# Patient Record
Sex: MALE | Race: WHITE | Employment: FULL TIME | URBAN - METROPOLITAN AREA
[De-identification: names, ages, dates, MRNs, and addresses within clinical notes are randomized per-mention and may not be internally consistent; named-entity substitution may affect disease eponyms.]

---

## 2019-07-20 ENCOUNTER — APPOINTMENT (OUTPATIENT)
Dept: GENERAL RADIOLOGY | Age: 59
DRG: 271 | End: 2019-07-20
Payer: COMMERCIAL

## 2019-07-20 ENCOUNTER — HOSPITAL ENCOUNTER (INPATIENT)
Age: 59
LOS: 11 days | Discharge: HOME | DRG: 271 | End: 2019-07-31
Attending: EMERGENCY MEDICINE | Admitting: SURGERY
Payer: COMMERCIAL

## 2019-07-20 ENCOUNTER — ANESTHESIA (OUTPATIENT)
Dept: OPERATING ROOM | Age: 59
DRG: 271 | End: 2019-07-20
Payer: COMMERCIAL

## 2019-07-20 ENCOUNTER — ANESTHESIA EVENT (OUTPATIENT)
Dept: OPERATING ROOM | Age: 59
DRG: 271 | End: 2019-07-20
Payer: COMMERCIAL

## 2019-07-20 VITALS — OXYGEN SATURATION: 100 % | SYSTOLIC BLOOD PRESSURE: 143 MMHG | DIASTOLIC BLOOD PRESSURE: 58 MMHG | TEMPERATURE: 96.6 F

## 2019-07-20 DIAGNOSIS — I70.0 AORTIC OCCLUSION (HCC): Primary | ICD-10-CM

## 2019-07-20 LAB
ABSOLUTE EOS #: 0 K/UL (ref 0–0.4)
ABSOLUTE EOS #: 0.11 K/UL (ref 0–0.44)
ABSOLUTE IMMATURE GRANULOCYTE: 0.09 K/UL (ref 0–0.3)
ABSOLUTE IMMATURE GRANULOCYTE: 0.27 K/UL (ref 0–0.3)
ABSOLUTE LYMPH #: 1.88 K/UL (ref 1–4.8)
ABSOLUTE LYMPH #: 2.94 K/UL (ref 1.1–3.7)
ABSOLUTE MONO #: 0.54 K/UL (ref 0.1–0.8)
ABSOLUTE MONO #: 1.31 K/UL (ref 0.1–1.2)
ALLEN TEST: ABNORMAL
ANION GAP SERPL CALCULATED.3IONS-SCNC: 12 MMOL/L (ref 9–17)
ANION GAP SERPL CALCULATED.3IONS-SCNC: 9 MMOL/L (ref 9–17)
BASOPHILS # BLD: 0 % (ref 0–2)
BASOPHILS # BLD: 1 % (ref 0–2)
BASOPHILS ABSOLUTE: 0 K/UL (ref 0–0.2)
BASOPHILS ABSOLUTE: 0.08 K/UL (ref 0–0.2)
BUN BLDV-MCNC: 6 MG/DL (ref 8–23)
BUN BLDV-MCNC: 7 MG/DL (ref 8–23)
BUN/CREAT BLD: ABNORMAL (ref 9–20)
BUN/CREAT BLD: ABNORMAL (ref 9–20)
CALCIUM SERPL-MCNC: 7.3 MG/DL (ref 8.6–10.4)
CALCIUM SERPL-MCNC: 8 MG/DL (ref 8.6–10.4)
CARBOXYHEMOGLOBIN: 3.4 % (ref 0–5)
CHLORIDE BLD-SCNC: 109 MMOL/L (ref 98–107)
CHLORIDE BLD-SCNC: 111 MMOL/L (ref 98–107)
CO2: 19 MMOL/L (ref 20–31)
CO2: 22 MMOL/L (ref 20–31)
CREAT SERPL-MCNC: 0.77 MG/DL (ref 0.7–1.2)
CREAT SERPL-MCNC: 0.77 MG/DL (ref 0.7–1.2)
DIFFERENTIAL TYPE: ABNORMAL
DIFFERENTIAL TYPE: ABNORMAL
EOSINOPHILS RELATIVE PERCENT: 0 % (ref 1–4)
EOSINOPHILS RELATIVE PERCENT: 1 % (ref 1–4)
FIO2: 70
GFR AFRICAN AMERICAN: >60 ML/MIN
GFR AFRICAN AMERICAN: >60 ML/MIN
GFR NON-AFRICAN AMERICAN: >60 ML/MIN
GFR NON-AFRICAN AMERICAN: >60 ML/MIN
GFR SERPL CREATININE-BSD FRML MDRD: ABNORMAL ML/MIN/{1.73_M2}
GLUCOSE BLD-MCNC: 130 MG/DL (ref 70–99)
GLUCOSE BLD-MCNC: 93 MG/DL (ref 70–99)
HCO3 ARTERIAL: 20.2 MMOL/L (ref 22–27)
HCT VFR BLD CALC: 40 % (ref 40.7–50.3)
HCT VFR BLD CALC: 40.2 %
HCT VFR BLD CALC: 41.4 % (ref 40.7–50.3)
HEMOGLOBIN: 13.1 G/DL (ref 13–17)
HEMOGLOBIN: 13.1 GM/DL
HEMOGLOBIN: 13.5 G/DL (ref 13–17)
IMMATURE GRANULOCYTES: 1 %
IMMATURE GRANULOCYTES: 1 %
LYMPHOCYTES # BLD: 18 % (ref 24–43)
LYMPHOCYTES # BLD: 7 % (ref 24–44)
MAGNESIUM: 1.6 MG/DL (ref 1.6–2.6)
MCH RBC QN AUTO: 30.4 PG (ref 25.2–33.5)
MCH RBC QN AUTO: 30.6 PG (ref 25.2–33.5)
MCHC RBC AUTO-ENTMCNC: 32.6 G/DL (ref 28.4–34.8)
MCHC RBC AUTO-ENTMCNC: 32.8 G/DL (ref 28.4–34.8)
MCV RBC AUTO: 93.2 FL (ref 82.6–102.9)
MCV RBC AUTO: 93.5 FL (ref 82.6–102.9)
METHEMOGLOBIN: ABNORMAL % (ref 0–1.5)
MODE: ABNORMAL
MONOCYTES # BLD: 2 % (ref 1–7)
MONOCYTES # BLD: 8 % (ref 3–12)
MORPHOLOGY: NORMAL
NEGATIVE BASE EXCESS, ART: 5 MMOL/L (ref 0–2)
NOTIFICATION TIME: ABNORMAL
NOTIFICATION: ABNORMAL
NRBC AUTOMATED: 0 PER 100 WBC
NRBC AUTOMATED: 0 PER 100 WBC
O2 DEVICE/FLOW/%: ABNORMAL
O2 SAT, ARTERIAL: 98.9 % (ref 94–100)
OXYHEMOGLOBIN: ABNORMAL % (ref 95–98)
PARTIAL THROMBOPLASTIN TIME: 117.6 SEC (ref 20.5–30.5)
PATIENT TEMP: 37
PCO2 ARTERIAL: 39.8 MMHG (ref 32–45)
PCO2, ART, TEMP ADJ: ABNORMAL (ref 32–45)
PDW BLD-RTO: 12.6 % (ref 11.8–14.4)
PDW BLD-RTO: 12.8 % (ref 11.8–14.4)
PEEP/CPAP: ABNORMAL
PH ARTERIAL: 7.33 (ref 7.35–7.45)
PH, ART, TEMP ADJ: ABNORMAL (ref 7.35–7.45)
PHOSPHORUS: 3.2 MG/DL (ref 2.5–4.5)
PLATELET # BLD: 178 K/UL (ref 138–453)
PLATELET # BLD: 220 K/UL (ref 138–453)
PLATELET ESTIMATE: ABNORMAL
PLATELET ESTIMATE: ABNORMAL
PMV BLD AUTO: 10.7 FL (ref 8.1–13.5)
PMV BLD AUTO: 9.7 FL (ref 8.1–13.5)
PO2 ARTERIAL: 215 MMHG (ref 75–95)
PO2, ART, TEMP ADJ: ABNORMAL MMHG (ref 75–95)
POSITIVE BASE EXCESS, ART: ABNORMAL MMOL/L (ref 0–2)
POTASSIUM SERPL-SCNC: 4 MMOL/L (ref 3.7–5.3)
POTASSIUM SERPL-SCNC: 4.2 MMOL/L (ref 3.7–5.3)
PSV: ABNORMAL
PT. POSITION: ABNORMAL
RBC # BLD: 4.28 M/UL (ref 4.21–5.77)
RBC # BLD: 4.44 M/UL (ref 4.21–5.77)
RBC # BLD: ABNORMAL 10*6/UL
RBC # BLD: ABNORMAL 10*6/UL
RESPIRATORY RATE: ABNORMAL
SAMPLE SITE: ABNORMAL
SEG NEUTROPHILS: 71 % (ref 36–65)
SEG NEUTROPHILS: 90 % (ref 36–66)
SEGMENTED NEUTROPHILS ABSOLUTE COUNT: 12.29 K/UL (ref 1.5–8.1)
SEGMENTED NEUTROPHILS ABSOLUTE COUNT: 24.21 K/UL (ref 1.8–7.7)
SET RATE: ABNORMAL
SODIUM BLD-SCNC: 140 MMOL/L (ref 135–144)
SODIUM BLD-SCNC: 142 MMOL/L (ref 135–144)
TEXT FOR RESPIRATORY: ABNORMAL
TOTAL HB: ABNORMAL G/DL (ref 12–16)
TOTAL RATE: ABNORMAL
VT: ABNORMAL
WBC # BLD: 16.8 K/UL (ref 3.5–11.3)
WBC # BLD: 26.9 K/UL (ref 3.5–11.3)
WBC # BLD: ABNORMAL 10*3/UL
WBC # BLD: ABNORMAL 10*3/UL

## 2019-07-20 PROCEDURE — 2580000003 HC RX 258: Performed by: STUDENT IN AN ORGANIZED HEALTH CARE EDUCATION/TRAINING PROGRAM

## 2019-07-20 PROCEDURE — 84100 ASSAY OF PHOSPHORUS: CPT

## 2019-07-20 PROCEDURE — 93005 ELECTROCARDIOGRAM TRACING: CPT | Performed by: EMERGENCY MEDICINE

## 2019-07-20 PROCEDURE — 87086 URINE CULTURE/COLONY COUNT: CPT

## 2019-07-20 PROCEDURE — 2580000003 HC RX 258: Performed by: NURSE ANESTHETIST, CERTIFIED REGISTERED

## 2019-07-20 PROCEDURE — 2500000003 HC RX 250 WO HCPCS: Performed by: NURSE ANESTHETIST, CERTIFIED REGISTERED

## 2019-07-20 PROCEDURE — 86901 BLOOD TYPING SEROLOGIC RH(D): CPT

## 2019-07-20 PROCEDURE — 86920 COMPATIBILITY TEST SPIN: CPT

## 2019-07-20 PROCEDURE — 6360000002 HC RX W HCPCS

## 2019-07-20 PROCEDURE — 6360000002 HC RX W HCPCS: Performed by: NURSE ANESTHETIST, CERTIFIED REGISTERED

## 2019-07-20 PROCEDURE — 85025 COMPLETE CBC W/AUTO DIFF WBC: CPT

## 2019-07-20 PROCEDURE — 99285 EMERGENCY DEPT VISIT HI MDM: CPT

## 2019-07-20 PROCEDURE — 6370000000 HC RX 637 (ALT 250 FOR IP): Performed by: SURGERY

## 2019-07-20 PROCEDURE — 82805 BLOOD GASES W/O2 SATURATION: CPT

## 2019-07-20 PROCEDURE — 86850 RBC ANTIBODY SCREEN: CPT

## 2019-07-20 PROCEDURE — 36415 COLL VENOUS BLD VENIPUNCTURE: CPT

## 2019-07-20 PROCEDURE — 7100000000 HC PACU RECOVERY - FIRST 15 MIN: Performed by: SURGERY

## 2019-07-20 PROCEDURE — 2500000003 HC RX 250 WO HCPCS: Performed by: STUDENT IN AN ORGANIZED HEALTH CARE EDUCATION/TRAINING PROGRAM

## 2019-07-20 PROCEDURE — 3600000015 HC SURGERY LEVEL 5 ADDTL 15MIN: Performed by: SURGERY

## 2019-07-20 PROCEDURE — 04100JK BYPASS ABDOMINAL AORTA TO BILATERAL FEMORAL ARTERIES WITH SYNTHETIC SUBSTITUTE, OPEN APPROACH: ICD-10-PCS | Performed by: SURGERY

## 2019-07-20 PROCEDURE — 83735 ASSAY OF MAGNESIUM: CPT

## 2019-07-20 PROCEDURE — 85730 THROMBOPLASTIN TIME PARTIAL: CPT

## 2019-07-20 PROCEDURE — 2500000003 HC RX 250 WO HCPCS: Performed by: SURGERY

## 2019-07-20 PROCEDURE — 3600000005 HC SURGERY LEVEL 5 BASE: Performed by: SURGERY

## 2019-07-20 PROCEDURE — 80048 BASIC METABOLIC PNL TOTAL CA: CPT

## 2019-07-20 PROCEDURE — 85014 HEMATOCRIT: CPT

## 2019-07-20 PROCEDURE — 2709999900 HC NON-CHARGEABLE SUPPLY: Performed by: SURGERY

## 2019-07-20 PROCEDURE — 6370000000 HC RX 637 (ALT 250 FOR IP): Performed by: STUDENT IN AN ORGANIZED HEALTH CARE EDUCATION/TRAINING PROGRAM

## 2019-07-20 PROCEDURE — 3700000001 HC ADD 15 MINUTES (ANESTHESIA): Performed by: SURGERY

## 2019-07-20 PROCEDURE — 3700000000 HC ANESTHESIA ATTENDED CARE: Performed by: SURGERY

## 2019-07-20 PROCEDURE — L8670 VASCULAR GRAFT, SYNTHETIC: HCPCS | Performed by: SURGERY

## 2019-07-20 PROCEDURE — 71045 X-RAY EXAM CHEST 1 VIEW: CPT

## 2019-07-20 PROCEDURE — 2580000003 HC RX 258: Performed by: SURGERY

## 2019-07-20 PROCEDURE — 6360000002 HC RX W HCPCS: Performed by: SURGERY

## 2019-07-20 PROCEDURE — 86900 BLOOD TYPING SEROLOGIC ABO: CPT

## 2019-07-20 PROCEDURE — 2000000000 HC ICU R&B

## 2019-07-20 PROCEDURE — 85018 HEMOGLOBIN: CPT

## 2019-07-20 PROCEDURE — 7100000001 HC PACU RECOVERY - ADDTL 15 MIN: Performed by: SURGERY

## 2019-07-20 PROCEDURE — C1757 CATH, THROMBECTOMY/EMBOLECT: HCPCS | Performed by: SURGERY

## 2019-07-20 PROCEDURE — 6360000002 HC RX W HCPCS: Performed by: STUDENT IN AN ORGANIZED HEALTH CARE EDUCATION/TRAINING PROGRAM

## 2019-07-20 PROCEDURE — 6360000002 HC RX W HCPCS: Performed by: ANESTHESIOLOGY

## 2019-07-20 DEVICE — IMPLANTABLE DEVICE: Type: IMPLANTABLE DEVICE | Site: AORTA | Status: FUNCTIONAL

## 2019-07-20 RX ORDER — ONDANSETRON 2 MG/ML
INJECTION INTRAMUSCULAR; INTRAVENOUS PRN
Status: DISCONTINUED | OUTPATIENT
Start: 2019-07-20 | End: 2019-07-20 | Stop reason: SDUPTHER

## 2019-07-20 RX ORDER — SODIUM CHLORIDE, SODIUM LACTATE, POTASSIUM CHLORIDE, CALCIUM CHLORIDE 600; 310; 30; 20 MG/100ML; MG/100ML; MG/100ML; MG/100ML
INJECTION, SOLUTION INTRAVENOUS CONTINUOUS PRN
Status: DISCONTINUED | OUTPATIENT
Start: 2019-07-20 | End: 2019-07-20 | Stop reason: SDUPTHER

## 2019-07-20 RX ORDER — ONDANSETRON 2 MG/ML
4 INJECTION INTRAMUSCULAR; INTRAVENOUS EVERY 6 HOURS PRN
Status: DISCONTINUED | OUTPATIENT
Start: 2019-07-20 | End: 2019-07-31 | Stop reason: HOSPADM

## 2019-07-20 RX ORDER — CEFAZOLIN SODIUM 1 G/3ML
INJECTION, POWDER, FOR SOLUTION INTRAMUSCULAR; INTRAVENOUS PRN
Status: DISCONTINUED | OUTPATIENT
Start: 2019-07-20 | End: 2019-07-20 | Stop reason: SDUPTHER

## 2019-07-20 RX ORDER — FENTANYL CITRATE 50 UG/ML
25 INJECTION, SOLUTION INTRAMUSCULAR; INTRAVENOUS EVERY 5 MIN PRN
Status: DISCONTINUED | OUTPATIENT
Start: 2019-07-20 | End: 2019-07-20 | Stop reason: HOSPADM

## 2019-07-20 RX ORDER — SODIUM CHLORIDE 0.9 % (FLUSH) 0.9 %
10 SYRINGE (ML) INJECTION PRN
Status: DISCONTINUED | OUTPATIENT
Start: 2019-07-20 | End: 2019-07-31 | Stop reason: HOSPADM

## 2019-07-20 RX ORDER — PROPOFOL 10 MG/ML
INJECTION, EMULSION INTRAVENOUS PRN
Status: DISCONTINUED | OUTPATIENT
Start: 2019-07-20 | End: 2019-07-20 | Stop reason: SDUPTHER

## 2019-07-20 RX ORDER — ACETAMINOPHEN 500 MG
1000 TABLET ORAL EVERY 8 HOURS
Status: DISCONTINUED | OUTPATIENT
Start: 2019-07-20 | End: 2019-07-31 | Stop reason: HOSPADM

## 2019-07-20 RX ORDER — LIDOCAINE HYDROCHLORIDE 10 MG/ML
INJECTION, SOLUTION EPIDURAL; INFILTRATION; INTRACAUDAL; PERINEURAL PRN
Status: DISCONTINUED | OUTPATIENT
Start: 2019-07-20 | End: 2019-07-20 | Stop reason: SDUPTHER

## 2019-07-20 RX ORDER — MAGNESIUM HYDROXIDE 1200 MG/15ML
LIQUID ORAL CONTINUOUS PRN
Status: COMPLETED | OUTPATIENT
Start: 2019-07-20 | End: 2019-07-20

## 2019-07-20 RX ORDER — FENTANYL CITRATE 50 UG/ML
25 INJECTION, SOLUTION INTRAMUSCULAR; INTRAVENOUS
Status: DISCONTINUED | OUTPATIENT
Start: 2019-07-20 | End: 2019-07-27

## 2019-07-20 RX ORDER — SODIUM CHLORIDE, SODIUM LACTATE, POTASSIUM CHLORIDE, CALCIUM CHLORIDE 600; 310; 30; 20 MG/100ML; MG/100ML; MG/100ML; MG/100ML
INJECTION, SOLUTION INTRAVENOUS CONTINUOUS
Status: DISCONTINUED | OUTPATIENT
Start: 2019-07-20 | End: 2019-07-25

## 2019-07-20 RX ORDER — ROCURONIUM BROMIDE 10 MG/ML
INJECTION, SOLUTION INTRAVENOUS PRN
Status: DISCONTINUED | OUTPATIENT
Start: 2019-07-20 | End: 2019-07-20 | Stop reason: SDUPTHER

## 2019-07-20 RX ORDER — SODIUM CHLORIDE 0.9 % (FLUSH) 0.9 %
10 SYRINGE (ML) INJECTION EVERY 12 HOURS SCHEDULED
Status: DISCONTINUED | OUTPATIENT
Start: 2019-07-20 | End: 2019-07-31 | Stop reason: HOSPADM

## 2019-07-20 RX ORDER — HEPARIN SODIUM 1000 [USP'U]/ML
INJECTION, SOLUTION INTRAVENOUS; SUBCUTANEOUS PRN
Status: DISCONTINUED | OUTPATIENT
Start: 2019-07-20 | End: 2019-07-20 | Stop reason: ALTCHOICE

## 2019-07-20 RX ORDER — SODIUM CHLORIDE 9 MG/ML
INJECTION, SOLUTION INTRAVENOUS CONTINUOUS PRN
Status: DISCONTINUED | OUTPATIENT
Start: 2019-07-20 | End: 2019-07-20 | Stop reason: SDUPTHER

## 2019-07-20 RX ORDER — DEXAMETHASONE SODIUM PHOSPHATE 10 MG/ML
INJECTION INTRAMUSCULAR; INTRAVENOUS PRN
Status: DISCONTINUED | OUTPATIENT
Start: 2019-07-20 | End: 2019-07-20 | Stop reason: SDUPTHER

## 2019-07-20 RX ORDER — OXYCODONE HYDROCHLORIDE 5 MG/1
5 TABLET ORAL EVERY 4 HOURS PRN
Status: DISCONTINUED | OUTPATIENT
Start: 2019-07-20 | End: 2019-07-27

## 2019-07-20 RX ORDER — EPHEDRINE SULFATE/0.9% NACL/PF 50 MG/5 ML
SYRINGE (ML) INTRAVENOUS PRN
Status: DISCONTINUED | OUTPATIENT
Start: 2019-07-20 | End: 2019-07-20 | Stop reason: SDUPTHER

## 2019-07-20 RX ORDER — 0.9 % SODIUM CHLORIDE 0.9 %
250 INTRAVENOUS SOLUTION INTRAVENOUS ONCE
Status: DISCONTINUED | OUTPATIENT
Start: 2019-07-20 | End: 2019-07-20

## 2019-07-20 RX ORDER — MORPHINE SULFATE 10 MG/ML
INJECTION, SOLUTION INTRAMUSCULAR; INTRAVENOUS PRN
Status: DISCONTINUED | OUTPATIENT
Start: 2019-07-20 | End: 2019-07-20 | Stop reason: SDUPTHER

## 2019-07-20 RX ORDER — HEPARIN SODIUM 1000 [USP'U]/ML
INJECTION, SOLUTION INTRAVENOUS; SUBCUTANEOUS PRN
Status: DISCONTINUED | OUTPATIENT
Start: 2019-07-20 | End: 2019-07-20 | Stop reason: SDUPTHER

## 2019-07-20 RX ORDER — PROTAMINE SULFATE 10 MG/ML
INJECTION, SOLUTION INTRAVENOUS PRN
Status: DISCONTINUED | OUTPATIENT
Start: 2019-07-20 | End: 2019-07-20 | Stop reason: SDUPTHER

## 2019-07-20 RX ORDER — HEPARIN SODIUM 10000 [USP'U]/100ML
INJECTION, SOLUTION INTRAVENOUS
Status: COMPLETED
Start: 2019-07-20 | End: 2019-07-20

## 2019-07-20 RX ORDER — FENTANYL CITRATE 50 UG/ML
50 INJECTION, SOLUTION INTRAMUSCULAR; INTRAVENOUS
Status: DISCONTINUED | OUTPATIENT
Start: 2019-07-20 | End: 2019-07-27

## 2019-07-20 RX ORDER — NEOSTIGMINE METHYLSULFATE 5 MG/5 ML
SYRINGE (ML) INTRAVENOUS PRN
Status: DISCONTINUED | OUTPATIENT
Start: 2019-07-20 | End: 2019-07-20 | Stop reason: SDUPTHER

## 2019-07-20 RX ORDER — FENTANYL CITRATE 50 UG/ML
INJECTION, SOLUTION INTRAMUSCULAR; INTRAVENOUS PRN
Status: DISCONTINUED | OUTPATIENT
Start: 2019-07-20 | End: 2019-07-20 | Stop reason: SDUPTHER

## 2019-07-20 RX ORDER — PHENYLEPHRINE HYDROCHLORIDE 10 MG/ML
INJECTION INTRAVENOUS PRN
Status: DISCONTINUED | OUTPATIENT
Start: 2019-07-20 | End: 2019-07-20 | Stop reason: SDUPTHER

## 2019-07-20 RX ORDER — ASPIRIN 81 MG/1
81 TABLET, CHEWABLE ORAL DAILY
Status: DISCONTINUED | OUTPATIENT
Start: 2019-07-20 | End: 2019-07-31 | Stop reason: HOSPADM

## 2019-07-20 RX ORDER — GLYCOPYRROLATE 1 MG/5 ML
SYRINGE (ML) INTRAVENOUS PRN
Status: DISCONTINUED | OUTPATIENT
Start: 2019-07-20 | End: 2019-07-20 | Stop reason: SDUPTHER

## 2019-07-20 RX ORDER — HEPARIN SODIUM 10000 [USP'U]/100ML
15 INJECTION, SOLUTION INTRAVENOUS CONTINUOUS
Status: DISCONTINUED | OUTPATIENT
Start: 2019-07-20 | End: 2019-07-20

## 2019-07-20 RX ORDER — FENTANYL CITRATE 50 UG/ML
50 INJECTION, SOLUTION INTRAMUSCULAR; INTRAVENOUS EVERY 5 MIN PRN
Status: DISCONTINUED | OUTPATIENT
Start: 2019-07-20 | End: 2019-07-20 | Stop reason: HOSPADM

## 2019-07-20 RX ORDER — DOCUSATE SODIUM 100 MG/1
100 CAPSULE, LIQUID FILLED ORAL 2 TIMES DAILY
Status: DISCONTINUED | OUTPATIENT
Start: 2019-07-20 | End: 2019-07-26

## 2019-07-20 RX ADMIN — PHENYLEPHRINE HYDROCHLORIDE 50 MCG: 10 INJECTION INTRAVENOUS at 17:27

## 2019-07-20 RX ADMIN — FENTANYL CITRATE 100 MCG: 50 INJECTION, SOLUTION INTRAMUSCULAR; INTRAVENOUS at 15:36

## 2019-07-20 RX ADMIN — Medication 10 MG: at 16:37

## 2019-07-20 RX ADMIN — PHENYLEPHRINE HYDROCHLORIDE 100 MCG: 10 INJECTION INTRAVENOUS at 15:03

## 2019-07-20 RX ADMIN — Medication 5 MG: at 16:57

## 2019-07-20 RX ADMIN — SODIUM CHLORIDE, POTASSIUM CHLORIDE, SODIUM LACTATE AND CALCIUM CHLORIDE: 600; 310; 30; 20 INJECTION, SOLUTION INTRAVENOUS at 14:54

## 2019-07-20 RX ADMIN — PHENYLEPHRINE HYDROCHLORIDE 100 MCG: 10 INJECTION INTRAVENOUS at 17:12

## 2019-07-20 RX ADMIN — HEPARIN SODIUM AND DEXTROSE 15 UNITS/KG/HR: 10000; 5 INJECTION INTRAVENOUS at 13:00

## 2019-07-20 RX ADMIN — ACETAMINOPHEN 1000 MG: 500 TABLET ORAL at 22:26

## 2019-07-20 RX ADMIN — FENTANYL CITRATE 50 MCG: 50 INJECTION, SOLUTION INTRAMUSCULAR; INTRAVENOUS at 17:49

## 2019-07-20 RX ADMIN — SODIUM CHLORIDE: 9 INJECTION, SOLUTION INTRAVENOUS at 14:38

## 2019-07-20 RX ADMIN — FENTANYL CITRATE 50 MCG: 50 INJECTION, SOLUTION INTRAMUSCULAR; INTRAVENOUS at 15:32

## 2019-07-20 RX ADMIN — FENTANYL CITRATE 50 MCG: 50 INJECTION, SOLUTION INTRAMUSCULAR; INTRAVENOUS at 15:24

## 2019-07-20 RX ADMIN — LIDOCAINE HYDROCHLORIDE 50 MG: 10 INJECTION, SOLUTION EPIDURAL; INFILTRATION; INTRACAUDAL; PERINEURAL at 14:44

## 2019-07-20 RX ADMIN — FENTANYL CITRATE 50 MCG: 50 INJECTION INTRAMUSCULAR; INTRAVENOUS at 19:20

## 2019-07-20 RX ADMIN — ROCURONIUM BROMIDE 10 MG: 10 INJECTION INTRAVENOUS at 15:10

## 2019-07-20 RX ADMIN — ROCURONIUM BROMIDE 10 MG: 10 INJECTION INTRAVENOUS at 15:51

## 2019-07-20 RX ADMIN — Medication 5 MG: at 16:45

## 2019-07-20 RX ADMIN — Medication 10 MG: at 16:15

## 2019-07-20 RX ADMIN — FENTANYL CITRATE 100 MCG: 50 INJECTION, SOLUTION INTRAMUSCULAR; INTRAVENOUS at 15:13

## 2019-07-20 RX ADMIN — ROCURONIUM BROMIDE 10 MG: 10 INJECTION INTRAVENOUS at 17:08

## 2019-07-20 RX ADMIN — HEPARIN SODIUM 15 UNITS/KG/HR: 10000 INJECTION, SOLUTION INTRAVENOUS at 13:00

## 2019-07-20 RX ADMIN — Medication 0.6 MG: at 18:35

## 2019-07-20 RX ADMIN — FENTANYL CITRATE 50 MCG: 50 INJECTION, SOLUTION INTRAMUSCULAR; INTRAVENOUS at 16:51

## 2019-07-20 RX ADMIN — ASPIRIN 81 MG: 81 TABLET, CHEWABLE ORAL at 22:26

## 2019-07-20 RX ADMIN — ONDANSETRON 4 MG: 2 INJECTION, SOLUTION INTRAMUSCULAR; INTRAVENOUS at 18:47

## 2019-07-20 RX ADMIN — ENOXAPARIN SODIUM 40 MG: 40 INJECTION SUBCUTANEOUS at 22:26

## 2019-07-20 RX ADMIN — CEFAZOLIN 2000 MG: 1 INJECTION, POWDER, FOR SOLUTION INTRAMUSCULAR; INTRAVENOUS at 15:07

## 2019-07-20 RX ADMIN — Medication 0.2 MG: at 15:10

## 2019-07-20 RX ADMIN — SODIUM CHLORIDE, POTASSIUM CHLORIDE, SODIUM LACTATE AND CALCIUM CHLORIDE: 600; 310; 30; 20 INJECTION, SOLUTION INTRAVENOUS at 20:46

## 2019-07-20 RX ADMIN — Medication 5 MG: at 15:04

## 2019-07-20 RX ADMIN — Medication 10 MG: at 15:08

## 2019-07-20 RX ADMIN — SODIUM CHLORIDE, POTASSIUM CHLORIDE, SODIUM LACTATE AND CALCIUM CHLORIDE: 600; 310; 30; 20 INJECTION, SOLUTION INTRAVENOUS at 18:27

## 2019-07-20 RX ADMIN — PHENYLEPHRINE HYDROCHLORIDE 50 MCG: 10 INJECTION INTRAVENOUS at 17:31

## 2019-07-20 RX ADMIN — SODIUM CHLORIDE, POTASSIUM CHLORIDE, SODIUM LACTATE AND CALCIUM CHLORIDE: 600; 310; 30; 20 INJECTION, SOLUTION INTRAVENOUS at 17:19

## 2019-07-20 RX ADMIN — ROCURONIUM BROMIDE 10 MG: 10 INJECTION INTRAVENOUS at 15:29

## 2019-07-20 RX ADMIN — PROPOFOL 200 MG: 10 INJECTION, EMULSION INTRAVENOUS at 14:44

## 2019-07-20 RX ADMIN — FAMOTIDINE 20 MG: 10 INJECTION, SOLUTION INTRAVENOUS at 22:26

## 2019-07-20 RX ADMIN — PHENYLEPHRINE HYDROCHLORIDE 50 MCG: 10 INJECTION INTRAVENOUS at 17:11

## 2019-07-20 RX ADMIN — PHENYLEPHRINE HYDROCHLORIDE 50 MCG: 10 INJECTION INTRAVENOUS at 16:14

## 2019-07-20 RX ADMIN — ROCURONIUM BROMIDE 10 MG: 10 INJECTION INTRAVENOUS at 16:42

## 2019-07-20 RX ADMIN — DEXAMETHASONE SODIUM PHOSPHATE 10 MG: 10 INJECTION INTRAMUSCULAR; INTRAVENOUS at 18:47

## 2019-07-20 RX ADMIN — PHENYLEPHRINE HYDROCHLORIDE 100 MCG: 10 INJECTION INTRAVENOUS at 17:16

## 2019-07-20 RX ADMIN — FENTANYL CITRATE 50 MCG: 50 INJECTION, SOLUTION INTRAMUSCULAR; INTRAVENOUS at 15:26

## 2019-07-20 RX ADMIN — HEPARIN SODIUM 2000 UNITS: 1000 INJECTION INTRAVENOUS; SUBCUTANEOUS at 17:03

## 2019-07-20 RX ADMIN — Medication 3 MG: at 18:35

## 2019-07-20 RX ADMIN — FENTANYL CITRATE 50 MCG: 50 INJECTION, SOLUTION INTRAMUSCULAR; INTRAVENOUS at 15:17

## 2019-07-20 RX ADMIN — ROCURONIUM BROMIDE 50 MG: 10 INJECTION INTRAVENOUS at 14:45

## 2019-07-20 RX ADMIN — ROCURONIUM BROMIDE 10 MG: 10 INJECTION INTRAVENOUS at 16:12

## 2019-07-20 RX ADMIN — FENTANYL CITRATE 50 MCG: 50 INJECTION INTRAMUSCULAR; INTRAVENOUS at 19:15

## 2019-07-20 RX ADMIN — PHENYLEPHRINE HYDROCHLORIDE 150 MCG: 10 INJECTION INTRAVENOUS at 17:33

## 2019-07-20 RX ADMIN — HEPARIN SODIUM 7000 UNITS: 1000 INJECTION INTRAVENOUS; SUBCUTANEOUS at 16:05

## 2019-07-20 RX ADMIN — SODIUM CHLORIDE, PRESERVATIVE FREE 10 ML: 5 INJECTION INTRAVENOUS at 20:46

## 2019-07-20 RX ADMIN — Medication 5 MG: at 16:32

## 2019-07-20 RX ADMIN — PROTAMINE SULFATE 40 MG: 10 INJECTION, SOLUTION INTRAVENOUS at 17:42

## 2019-07-20 RX ADMIN — FENTANYL CITRATE 100 MCG: 50 INJECTION, SOLUTION INTRAMUSCULAR; INTRAVENOUS at 15:19

## 2019-07-20 RX ADMIN — MORPHINE SULFATE 10 MG: 10 INJECTION, SOLUTION INTRAMUSCULAR; INTRAVENOUS at 17:58

## 2019-07-20 ASSESSMENT — PULMONARY FUNCTION TESTS
PIF_VALUE: 17
PIF_VALUE: 20
PIF_VALUE: 16
PIF_VALUE: 17
PIF_VALUE: 10
PIF_VALUE: 18
PIF_VALUE: 18
PIF_VALUE: 17
PIF_VALUE: 18
PIF_VALUE: 16
PIF_VALUE: 18
PIF_VALUE: 18
PIF_VALUE: 9
PIF_VALUE: 16
PIF_VALUE: 13
PIF_VALUE: 18
PIF_VALUE: 20
PIF_VALUE: 18
PIF_VALUE: 20
PIF_VALUE: 16
PIF_VALUE: 18
PIF_VALUE: 17
PIF_VALUE: 17
PIF_VALUE: 16
PIF_VALUE: 17
PIF_VALUE: 16
PIF_VALUE: 19
PIF_VALUE: 18
PIF_VALUE: 18
PIF_VALUE: 16
PIF_VALUE: 18
PIF_VALUE: 18
PIF_VALUE: 16
PIF_VALUE: 18
PIF_VALUE: 18
PIF_VALUE: 17
PIF_VALUE: 16
PIF_VALUE: 18
PIF_VALUE: 19
PIF_VALUE: 1
PIF_VALUE: 18
PIF_VALUE: 20
PIF_VALUE: 18
PIF_VALUE: 18
PIF_VALUE: 17
PIF_VALUE: 18
PIF_VALUE: 16
PIF_VALUE: 17
PIF_VALUE: 6
PIF_VALUE: 16
PIF_VALUE: 18
PIF_VALUE: 20
PIF_VALUE: 16
PIF_VALUE: 18
PIF_VALUE: 17
PIF_VALUE: 16
PIF_VALUE: 17
PIF_VALUE: 16
PIF_VALUE: 1
PIF_VALUE: 4
PIF_VALUE: 18
PIF_VALUE: 18
PIF_VALUE: 3
PIF_VALUE: 18
PIF_VALUE: 17
PIF_VALUE: 6
PIF_VALUE: 18
PIF_VALUE: 2
PIF_VALUE: 3
PIF_VALUE: 16
PIF_VALUE: 5
PIF_VALUE: 1
PIF_VALUE: 18
PIF_VALUE: 20
PIF_VALUE: 18
PIF_VALUE: 20
PIF_VALUE: 19
PIF_VALUE: 18
PIF_VALUE: 16
PIF_VALUE: 18
PIF_VALUE: 17
PIF_VALUE: 17
PIF_VALUE: 18
PIF_VALUE: 20
PIF_VALUE: 18
PIF_VALUE: 15
PIF_VALUE: 20
PIF_VALUE: 17
PIF_VALUE: 1
PIF_VALUE: 18
PIF_VALUE: 16
PIF_VALUE: 20
PIF_VALUE: 17
PIF_VALUE: 16
PIF_VALUE: 17
PIF_VALUE: 17
PIF_VALUE: 20
PIF_VALUE: 16
PIF_VALUE: 15
PIF_VALUE: 18
PIF_VALUE: 16
PIF_VALUE: 18
PIF_VALUE: 17
PIF_VALUE: 18
PIF_VALUE: 17
PIF_VALUE: 18
PIF_VALUE: 17
PIF_VALUE: 19
PIF_VALUE: 16
PIF_VALUE: 17
PIF_VALUE: 16
PIF_VALUE: 23
PIF_VALUE: 16
PIF_VALUE: 18
PIF_VALUE: 15
PIF_VALUE: 18
PIF_VALUE: 17
PIF_VALUE: 16
PIF_VALUE: 16
PIF_VALUE: 18
PIF_VALUE: 17
PIF_VALUE: 17
PIF_VALUE: 6
PIF_VALUE: 18
PIF_VALUE: 18
PIF_VALUE: 16
PIF_VALUE: 18
PIF_VALUE: 17
PIF_VALUE: 21
PIF_VALUE: 18
PIF_VALUE: 17
PIF_VALUE: 18
PIF_VALUE: 18
PIF_VALUE: 17
PIF_VALUE: 16
PIF_VALUE: 16
PIF_VALUE: 18
PIF_VALUE: 18
PIF_VALUE: 17
PIF_VALUE: 16
PIF_VALUE: 18
PIF_VALUE: 17
PIF_VALUE: 18
PIF_VALUE: 16
PIF_VALUE: 18
PIF_VALUE: 16
PIF_VALUE: 17
PIF_VALUE: 18
PIF_VALUE: 17
PIF_VALUE: 17
PIF_VALUE: 16
PIF_VALUE: 17
PIF_VALUE: 18
PIF_VALUE: 16
PIF_VALUE: 18
PIF_VALUE: 18
PIF_VALUE: 20
PIF_VALUE: 19
PIF_VALUE: 15
PIF_VALUE: 18
PIF_VALUE: 17
PIF_VALUE: 16
PIF_VALUE: 19
PIF_VALUE: 17
PIF_VALUE: 18
PIF_VALUE: 18
PIF_VALUE: 19
PIF_VALUE: 17
PIF_VALUE: 18
PIF_VALUE: 18
PIF_VALUE: 16
PIF_VALUE: 17
PIF_VALUE: 16
PIF_VALUE: 20
PIF_VALUE: 16
PIF_VALUE: 17
PIF_VALUE: 18
PIF_VALUE: 16
PIF_VALUE: 4
PIF_VALUE: 16
PIF_VALUE: 2
PIF_VALUE: 7
PIF_VALUE: 15
PIF_VALUE: 1
PIF_VALUE: 18
PIF_VALUE: 18
PIF_VALUE: 16
PIF_VALUE: 17
PIF_VALUE: 18
PIF_VALUE: 2
PIF_VALUE: 17
PIF_VALUE: 18
PIF_VALUE: 8
PIF_VALUE: 18
PIF_VALUE: 18
PIF_VALUE: 17
PIF_VALUE: 15
PIF_VALUE: 16
PIF_VALUE: 16
PIF_VALUE: 18
PIF_VALUE: 18
PIF_VALUE: 17
PIF_VALUE: 17
PIF_VALUE: 18
PIF_VALUE: 18
PIF_VALUE: 17
PIF_VALUE: 20
PIF_VALUE: 16
PIF_VALUE: 18
PIF_VALUE: 17
PIF_VALUE: 16
PIF_VALUE: 17
PIF_VALUE: 18
PIF_VALUE: 18
PIF_VALUE: 17
PIF_VALUE: 18
PIF_VALUE: 17
PIF_VALUE: 18
PIF_VALUE: 17
PIF_VALUE: 18
PIF_VALUE: 17
PIF_VALUE: 20
PIF_VALUE: 18
PIF_VALUE: 4
PIF_VALUE: 19
PIF_VALUE: 16
PIF_VALUE: 3
PIF_VALUE: 15
PIF_VALUE: 18
PIF_VALUE: 18
PIF_VALUE: 17
PIF_VALUE: 18

## 2019-07-20 ASSESSMENT — ENCOUNTER SYMPTOMS
SINUS PAIN: 0
BLOOD IN STOOL: 0
RHINORRHEA: 0
EYE PAIN: 0
DIARRHEA: 0
VOMITING: 0
SHORTNESS OF BREATH: 0
APNEA: 0
WHEEZING: 0
ABDOMINAL PAIN: 0
EYE REDNESS: 0
NAUSEA: 0
COUGH: 0

## 2019-07-20 ASSESSMENT — PAIN DESCRIPTION - PAIN TYPE
TYPE: SURGICAL PAIN
TYPE: SURGICAL PAIN
TYPE: ACUTE PAIN

## 2019-07-20 ASSESSMENT — PAIN SCALES - GENERAL
PAINLEVEL_OUTOF10: 7
PAINLEVEL_OUTOF10: 5
PAINLEVEL_OUTOF10: 6
PAINLEVEL_OUTOF10: 7
PAINLEVEL_OUTOF10: 4

## 2019-07-20 ASSESSMENT — PAIN DESCRIPTION - LOCATION
LOCATION: ABDOMEN
LOCATION: ABDOMEN
LOCATION: FOOT

## 2019-07-20 ASSESSMENT — PAIN DESCRIPTION - ORIENTATION
ORIENTATION: LEFT
ORIENTATION: MID

## 2019-07-20 ASSESSMENT — PAIN DESCRIPTION - DESCRIPTORS
DESCRIPTORS: ACHING
DESCRIPTORS: ACHING

## 2019-07-20 ASSESSMENT — LIFESTYLE VARIABLES: SMOKING_STATUS: 1

## 2019-07-20 ASSESSMENT — PAIN DESCRIPTION - FREQUENCY: FREQUENCY: INTERMITTENT

## 2019-07-20 NOTE — ED NOTES
Pt placed on cardiac monitor, BP cuff, and pulse ox. Alarms set.       Nasim Okeefe RN  07/20/19 7483

## 2019-07-20 NOTE — ED NOTES
Pt to Ed with onset aortic occlusion from [address] [place] [address]. Pt came in by flight. Pt states he is a heavy smoker of 1PPD and Luc Capuchin. Pt states recently getting back from [facility] 4 hr plane ride. Pt states no blood thinners. Pt had heparin infusion during flight along with 6000 units bolus heparin. Pt shows no acute distress at bedside. Monitor placed on arrival. ekg done. IVX2 placed. Blood sent to lab. Pt resting in bed with family. Vascular notified before patients arrival. Pt resting in bed.  Unable to find pulses in patients feet therefore dopplers at bedside with vascular team.      Radha Potts, DAVID  07/20/19 7098 [address]  07/20/19 4128

## 2019-07-20 NOTE — ED PROVIDER NOTES
[facility]  ED  Emergency Department Encounter  EmergencyMedicineResident     Pt Name: Robb Mcelroy  MRN: [id number]  Elsygfrenata 1960  Date ofevaluation: 7/20/19  PCP:  No primary care provider on file. 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 left foot pain. HISTORY OF PRESENT ILLNESS  (Location/Symptom, Timing/Onset, Context/Setting, Quality, Duration, Modifying Factors, Severity.)      Robb Mcelroy is a 60 y.o. male who presents with occlusion of the aorta. This is a transfer from [facility].  Patient presents to the ED with bilateral lower extremity numbness and tingling, with pain in the left foot. He presented to an outside hospital for bilateral lower extremity paresthesias that started 12 hours prior to arrival to the outside hospital.  Patient's toes were purple this morning until he stands up. Patient had a syncopal episode yesterday while sitting at the table and was seen at [facility] and diagnosed dehydration. Never had these symptoms before. On presentation here, dorsalis pedis and posterior tibial pulses are dopplerable bilaterally; nonpalpable. Patient smokes 1 pack cigarettes/day. PAST MEDICAL / SURGICAL /SOCIAL / FAMILY HISTORY      has a past medical history of Hyperlipidemia and Hypertension. has a past surgical history that includes hernia repair (Bilateral).       Social History     Socioeconomic History    Marital status: Not on file     Spouse name: Not on file    Number of children: Not on file    Years of education: Not on file    Highest education level: Not on file   Occupational History    Not on file   Social Needs    Financial resource strain: Not on file    Food insecurity:     Worry: Not on file     Inability: Not on file    Transportation needs:     Medical: Not on file Platelets 037 304 - 225 k/uL    MPV 10.7 8.1 - 13.5 fL    NRBC Automated 0.0 0.0 per 100 WBC    Differential Type NOT REPORTED     Seg Neutrophils 71 (H) 36 - 65 %    Lymphocytes 18 (L) 24 - 43 %    Monocytes 8 3 - 12 %    Eosinophils % 1 1 - 4 %    Basophils 1 0 - 2 %    Immature Granulocytes 1 (H) 0 %    Segs Absolute 12.29 (H) 1.50 - 8.10 k/uL    Absolute Lymph # 2.94 1.10 - 3.70 k/uL    Absolute Mono # 1.31 (H) 0.10 - 1.20 k/uL    Absolute Eos # 0.11 0.00 - 0.44 k/uL    Basophils # 0.08 0.00 - 0.20 k/uL    Absolute Immature Granulocyte 0.09 0.00 - 0.30 k/uL    WBC Morphology NOT REPORTED     RBC Morphology NOT REPORTED     Platelet Estimate NOT REPORTED    BASIC METABOLIC PANEL   Result Value Ref Range    Glucose 93 70 - 99 mg/dL    BUN 7 (L) 8 - 23 mg/dL    CREATININE 0.77 0.70 - 1.20 mg/dL    Bun/Cre Ratio NOT REPORTED 9 - 20    Calcium 8.0 (L) 8.6 - 10.4 mg/dL    Sodium 140 135 - 144 mmol/L    Potassium 4.2 3.7 - 5.3 mmol/L    Chloride 109 (H) 98 - 107 mmol/L    CO2 19 (L) 20 - 31 mmol/L    Anion Gap 12 9 - 17 mmol/L    GFR Non-African American >60 >60 mL/min    GFR African American >60 >60 mL/min    GFR Comment          GFR Staging NOT REPORTED    TYPE AND SCREEN   Result Value Ref Range    Expiration Date 07/23/2019     Arm Band Number [id number]     ABO/Rh A POSITIVE     Antibody Screen NEGATIVE     Unit Number [id number]     Product Code Leukocyte Reduced Red Cell     Unit Divison 00     Dispense Status ALLOCATED     Transfusion Status OK TO TRANSFUSE     Crossmatch Result COMPATIBLE     Unit Number [id number]     Product Code Leukocyte Reduced Red Cell     Unit Divison 00     Dispense Status ALLOCATED     Transfusion Status OK TO TRANSFUSE     Crossmatch Result COMPATIBLE     Unit Number [id number]     Product Code Leukocyte Reduced Red Cell     Unit Divison 00     Dispense Status ALLOCATED     Transfusion Status OK TO TRANSFUSE     Crossmatch Result COMPATIBLE     Unit Number [id number]

## 2019-07-20 NOTE — ANESTHESIA PRE PROCEDURE
Neuro/Psych ROS              GI/Hepatic/Renal: Neg GI/Hepatic/Renal ROS            Endo/Other: Negative Endo/Other ROS                    Abdominal:           Vascular:                                        Anesthesia Plan      general     ASA 4 - emergent       Induction: intravenous. arterial line and central line  MIPS: Postoperative opioids intended and Postoperative ventilation. Anesthetic plan and risks discussed with patient and spouse. Plan discussed with CRNA.                   Connie Hollis MD   7/20/2019

## 2019-07-20 NOTE — ANESTHESIA PROCEDURE NOTES
Central Venous Line:    A central venous line was placed using ultrasound guidance and surface landmarks, in the OR for the following indication(s): central venous access and CVP monitoring.  7/20/2019 2:50 PM7/20/2019 2:55 PM    Sterility preparation included the following: hand hygiene performed prior to procedure, maximum sterile barriers used and sterile technique used to drape from head to toe. The patient was placed in Trendelenburg position. The right internal jugular vein was prepped. The site was prepped with Chloraprep. A 9 Fr (size), 10 (length), introducer single lumen was placed. During the procedure, the following specific steps were taken: target vein identified, needle advanced into vein and blood aspirated and guidewire advanced into vein. Intravenous verification was obtained by ultrasound. Post insertion care included: all ports aspirated, all ports flushed easily, guidewire removed intact, Biopatch applied, line sutured in place and dressing applied. During the procedure the patient experienced: patient tolerated procedure well with no complications. Anesthesia type: general.. No    Additional notes:  U/S 32914.  (1) US was used to identify the right IJ vessel. (2) It was assessed and patent. (3) US was used to visualize vascular needle entry into the right IJ vessel.  (4) The selected vessel appeared anatomically normal and (5) there were no apparent abnormal findings. (6) A permanent ultrasound image was saved in the patient's record.       Staffing  Anesthesiologist: Jonah Norris MD  Performed: Anesthesiologist   Preanesthetic Checklist  Completed: patient identified, timeout performed and patient being monitored

## 2019-07-21 LAB
ABSOLUTE EOS #: 0 K/UL (ref 0–0.4)
ABSOLUTE IMMATURE GRANULOCYTE: 0.26 K/UL (ref 0–0.3)
ABSOLUTE LYMPH #: 0.26 K/UL (ref 1–4.8)
ABSOLUTE MONO #: 1.57 K/UL (ref 0.1–0.8)
ANION GAP SERPL CALCULATED.3IONS-SCNC: 10 MMOL/L (ref 9–17)
BASOPHILS # BLD: 0 % (ref 0–2)
BASOPHILS ABSOLUTE: 0 K/UL (ref 0–0.2)
BUN BLDV-MCNC: 9 MG/DL (ref 8–23)
BUN/CREAT BLD: ABNORMAL (ref 9–20)
CALCIUM SERPL-MCNC: 7.7 MG/DL (ref 8.6–10.4)
CHLORIDE BLD-SCNC: 105 MMOL/L (ref 98–107)
CO2: 23 MMOL/L (ref 20–31)
CREAT SERPL-MCNC: 0.88 MG/DL (ref 0.7–1.2)
CULTURE: NO GROWTH
DIFFERENTIAL TYPE: ABNORMAL
EOSINOPHILS RELATIVE PERCENT: 0 % (ref 1–4)
GFR AFRICAN AMERICAN: >60 ML/MIN
GFR NON-AFRICAN AMERICAN: >60 ML/MIN
GFR SERPL CREATININE-BSD FRML MDRD: ABNORMAL ML/MIN/{1.73_M2}
GFR SERPL CREATININE-BSD FRML MDRD: ABNORMAL ML/MIN/{1.73_M2}
GLUCOSE BLD-MCNC: 143 MG/DL (ref 70–99)
HCT VFR BLD CALC: 38.2 % (ref 40.7–50.3)
HEMOGLOBIN: 12.7 G/DL (ref 13–17)
IMMATURE GRANULOCYTES: 1 %
LYMPHOCYTES # BLD: 1 % (ref 24–44)
Lab: NORMAL
MAGNESIUM: 1.6 MG/DL (ref 1.6–2.6)
MCH RBC QN AUTO: 31 PG (ref 25.2–33.5)
MCHC RBC AUTO-ENTMCNC: 33.2 G/DL (ref 28.4–34.8)
MCV RBC AUTO: 93.2 FL (ref 82.6–102.9)
MONOCYTES # BLD: 6 % (ref 1–7)
MORPHOLOGY: NORMAL
NRBC AUTOMATED: 0 PER 100 WBC
PDW BLD-RTO: 12.8 % (ref 11.8–14.4)
PHOSPHORUS: 2.8 MG/DL (ref 2.5–4.5)
PLATELET # BLD: 161 K/UL (ref 138–453)
PLATELET ESTIMATE: ABNORMAL
PMV BLD AUTO: 9.9 FL (ref 8.1–13.5)
POTASSIUM SERPL-SCNC: 4 MMOL/L (ref 3.7–5.3)
RBC # BLD: 4.1 M/UL (ref 4.21–5.77)
RBC # BLD: ABNORMAL 10*6/UL
SEG NEUTROPHILS: 92 % (ref 36–66)
SEGMENTED NEUTROPHILS ABSOLUTE COUNT: 24.01 K/UL (ref 1.8–7.7)
SODIUM BLD-SCNC: 138 MMOL/L (ref 135–144)
SPECIMEN DESCRIPTION: NORMAL
WBC # BLD: 26.1 K/UL (ref 3.5–11.3)
WBC # BLD: ABNORMAL 10*3/UL

## 2019-07-21 PROCEDURE — 97166 OT EVAL MOD COMPLEX 45 MIN: CPT

## 2019-07-21 PROCEDURE — 6370000000 HC RX 637 (ALT 250 FOR IP): Performed by: STUDENT IN AN ORGANIZED HEALTH CARE EDUCATION/TRAINING PROGRAM

## 2019-07-21 PROCEDURE — 2500000003 HC RX 250 WO HCPCS: Performed by: STUDENT IN AN ORGANIZED HEALTH CARE EDUCATION/TRAINING PROGRAM

## 2019-07-21 PROCEDURE — 84100 ASSAY OF PHOSPHORUS: CPT

## 2019-07-21 PROCEDURE — 2000000000 HC ICU R&B

## 2019-07-21 PROCEDURE — 51798 US URINE CAPACITY MEASURE: CPT

## 2019-07-21 PROCEDURE — 2580000003 HC RX 258: Performed by: STUDENT IN AN ORGANIZED HEALTH CARE EDUCATION/TRAINING PROGRAM

## 2019-07-21 PROCEDURE — 85025 COMPLETE CBC W/AUTO DIFF WBC: CPT

## 2019-07-21 PROCEDURE — 97530 THERAPEUTIC ACTIVITIES: CPT

## 2019-07-21 PROCEDURE — 97162 PT EVAL MOD COMPLEX 30 MIN: CPT

## 2019-07-21 PROCEDURE — 6360000002 HC RX W HCPCS: Performed by: STUDENT IN AN ORGANIZED HEALTH CARE EDUCATION/TRAINING PROGRAM

## 2019-07-21 PROCEDURE — 94760 N-INVAS EAR/PLS OXIMETRY 1: CPT

## 2019-07-21 PROCEDURE — 83735 ASSAY OF MAGNESIUM: CPT

## 2019-07-21 PROCEDURE — 97535 SELF CARE MNGMENT TRAINING: CPT

## 2019-07-21 PROCEDURE — 80048 BASIC METABOLIC PNL TOTAL CA: CPT

## 2019-07-21 PROCEDURE — 2700000000 HC OXYGEN THERAPY PER DAY

## 2019-07-21 RX ADMIN — FAMOTIDINE 20 MG: 10 INJECTION, SOLUTION INTRAVENOUS at 20:43

## 2019-07-21 RX ADMIN — FENTANYL CITRATE 50 MCG: 50 INJECTION INTRAMUSCULAR; INTRAVENOUS at 15:48

## 2019-07-21 RX ADMIN — OXYCODONE HYDROCHLORIDE 5 MG: 5 TABLET ORAL at 16:29

## 2019-07-21 RX ADMIN — OXYCODONE HYDROCHLORIDE 5 MG: 5 TABLET ORAL at 22:32

## 2019-07-21 RX ADMIN — ASPIRIN 81 MG: 81 TABLET, CHEWABLE ORAL at 08:50

## 2019-07-21 RX ADMIN — FENTANYL CITRATE 50 MCG: 50 INJECTION INTRAMUSCULAR; INTRAVENOUS at 20:43

## 2019-07-21 RX ADMIN — SODIUM CHLORIDE, PRESERVATIVE FREE 10 ML: 5 INJECTION INTRAVENOUS at 09:30

## 2019-07-21 RX ADMIN — OXYCODONE HYDROCHLORIDE 5 MG: 5 TABLET ORAL at 11:34

## 2019-07-21 RX ADMIN — ENOXAPARIN SODIUM 40 MG: 40 INJECTION SUBCUTANEOUS at 08:50

## 2019-07-21 RX ADMIN — ACETAMINOPHEN 1000 MG: 500 TABLET ORAL at 14:20

## 2019-07-21 RX ADMIN — FAMOTIDINE 20 MG: 10 INJECTION, SOLUTION INTRAVENOUS at 08:50

## 2019-07-21 RX ADMIN — ACETAMINOPHEN 1000 MG: 500 TABLET ORAL at 20:43

## 2019-07-21 RX ADMIN — FENTANYL CITRATE 50 MCG: 50 INJECTION INTRAMUSCULAR; INTRAVENOUS at 16:50

## 2019-07-21 RX ADMIN — FENTANYL CITRATE 50 MCG: 50 INJECTION INTRAMUSCULAR; INTRAVENOUS at 17:49

## 2019-07-21 RX ADMIN — ACETAMINOPHEN 1000 MG: 500 TABLET ORAL at 05:15

## 2019-07-21 ASSESSMENT — PAIN SCALES - GENERAL
PAINLEVEL_OUTOF10: 6
PAINLEVEL_OUTOF10: 5
PAINLEVEL_OUTOF10: 7
PAINLEVEL_OUTOF10: 6
PAINLEVEL_OUTOF10: 10
PAINLEVEL_OUTOF10: 6
PAINLEVEL_OUTOF10: 2
PAINLEVEL_OUTOF10: 2
PAINLEVEL_OUTOF10: 4
PAINLEVEL_OUTOF10: 8
PAINLEVEL_OUTOF10: 0
PAINLEVEL_OUTOF10: 8
PAINLEVEL_OUTOF10: 5
PAINLEVEL_OUTOF10: 5
PAINLEVEL_OUTOF10: 7

## 2019-07-21 ASSESSMENT — PAIN DESCRIPTION - LOCATION
LOCATION: LEG;ABDOMEN
LOCATION: ABDOMEN

## 2019-07-21 ASSESSMENT — PAIN DESCRIPTION - ORIENTATION: ORIENTATION: RIGHT;LEFT;MID

## 2019-07-21 ASSESSMENT — PAIN DESCRIPTION - PAIN TYPE: TYPE: ACUTE PAIN

## 2019-07-21 NOTE — PROGRESS NOTES
+ B fem thrombectomy 7/20    Subjective   General  Patient assessed for rehabilitation services?: Yes  Family / Caregiver Present: Yes(wife & daughter)  Patient Currently in Pain: Yes  Pain Assessment  Pain Assessment: 0-10  Pain Level: 5  Pain Type: Acute pain  Pain Location: Abdomen  Pain Orientation: Right;Left;Mid  Non-Pharmaceutical Pain Intervention(s): Distraction; Therapeutic presence  Response to Pain Intervention: Patient Satisfied  Vital Signs  Patient Currently in Pain: Yes  Social/Functional History  Social/Functional History  Lives With: Spouse  Type of Home: House  Home Layout: One level  Home Access: Stairs to enter without rails  Entrance Stairs - Number of Steps: 1  Bathroom Shower/Tub: Tub/Shower unit  Bathroom Toilet: Standard  Bathroom Equipment: Shower chair  Bathroom Accessibility: Accessible  Home Equipment: (none)  Receives Help From: Family  ADL Assistance: Independent  Homemaking Assistance: Independent  Homemaking Responsibilities: No(Wife completes all IADL tasks)  Meal Prep Responsibility: No  Laundry Responsibility: No  Cleaning Responsibility: No  Ambulation Assistance: Independent  Transfer Assistance: Independent  Active : Yes  Mode of Transportation: SUV  Occupation: Full time employment  Type of occupation: warehouse  IADL Comments: Wife completes all IADL tasks and reports will be taking off outside tasks until pt recovers  Additional Comments: Wife reports having flexible schedule to be home with patient and having great family support to stay with patient PRN        Objective   Vision: Impaired  Vision Exceptions: Wears glasses for distance  Hearing: Within functional limits    Orientation  Overall Orientation Status: Within Functional Limits     Balance  Sitting Balance: Supervision  Standing Balance: Contact guard assistance  Standing Balance  Time: ~6 minutes   Activity: static standing, functional mobility, dynamic standing tolieting   Functional Mobility  Functional - T-Scale Score : 40.22 (07/21/19 1410)  ADL Inpatient CMS 0-100% Score: 42.8 (07/21/19 1410)  ADL Inpatient CMS G-Code Modifier : CK (07/21/19 1410)    Goals  Short term goals  Time Frame for Short term goals: Patient will, by discharge  Short term goal 1: demonstrate LB self-cares with AE PRN at Mod I   Short term goal 2: demonstrate UB self-cares at Mod I with compensatory strategies PRN   Short term goal 3: demonstrate ~25 minutes of functional activity tolerance to engage in self-care tasks   Short term goal 4: demonstrate functional transfers/mobility at Supervision with good safety awareness to complete self-cares  Short term goal 5: demonstrate ~15 minutes of dynamic standing tolerance to engage in self-cares/IADL tasks at [facility]        Therapy Time   Individual Concurrent Group Co-treatment   Time In 1112         Time Out 1131         Minutes Kenyon Alaniz OTR/L

## 2019-07-21 NOTE — PROGRESS NOTES
Nsg assessment completed, see flow. Patient oob to chair awake and alert. Left nare NGT to LIS. No c/o pain no s/s of distress noted. Safety measures in place. B/L femoral groin incision sites healthy.

## 2019-07-21 NOTE — FLOWSHEET NOTE
 Spiritual Assessment: The patient was awake and alert when I entered the room, though the patient may also have been sleeping. Patient explained his vascular procedure that was done yesterday. Patient said that his family has been visiting and will visit today when they are able. Patient appears to be in fair spirits, perhaps a little tired.  Intervention: I had a conversation with the patient to assess any spiritual needs.  Outcome: The patient did not express any spiritual needs at this time. I told the patient that there is always a  here at the hospital and we can be reached through his nurse upon request.        07/21/19 9973   Encounter Summary   Services provided to: Patient   Referral/Consult From: [address] [place] [address] Family members;Spouse; Children   Place of [facility]   (None)   Continue Visiting   (07/21/19)   Complexity of Encounter Low   Length of Encounter 15 minutes   Spiritual Assessment Completed Yes   Routine   Type Initial   Assessment Approachable; Hopeful;Coping   Intervention Active listening;Nurtured hope;Sustaining presence/ Ministry of presence   Outcome Expressed gratitude;Comfort;Engaged in conversation;Coping

## 2019-07-21 NOTE — CARE COORDINATION
Case Management Initial Discharge Plan  Robb Mcelroy             Met with:patient to discuss discharge plans. Information verified: address, contacts, phone number, , insurance Yes  PCP: Michelle Christie MD  Date of last visit: couple months ago    Insurance Provider: [employer] [place]    Discharge Planning    Living Arrangements:  Spouse/Significant Other   Support Systems:  Spouse/Significant Other, Family Members, Jone Michaels has 1 stories  1 stairs to climb to get into front door,   Location of bedroom/bathroom in home main    Patient able to perform ADL's:Independent    Current Services (outpatient & in home) none  DME equipment: none  DME provider: none    Pharmacy: [facility] Medications:  No  Does patient want to participate in local refill/ meds to beds program?  No    Potential Assistance Needed:  N/A    Patient agreeable to home care: No  [place] of choice provided:  no    Prior SNF/Rehab Placement and Facility: no  Agreeable to SNF/Rehab: No  [place] of choice provided: no   Evaluation: no    Expected Discharge date:     Patient expects to be discharged to:  home  Follow Up Appointment: Best Day/ Time:      Transportation provider: family  Transportation arrangements needed for discharge: No    Readmission Risk              Risk of Unplanned Readmission:        10             Does patient have a readmission risk score greater than 14?: No  If yes, follow-up appointment must be made within 7 days of discharge.      Discharge Plan: home with wife          Electronically signed by Aristides Will RN on 19 at 8:48 AM

## 2019-07-21 NOTE — PROGRESS NOTES
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 left foot pain. Date of Service: 2019    Discharge Recommendations:    CTA, likely No further therapy required at discharge. PT Equipment Recommendations  Other: TBD, likely none    Assessment   Body structures, Functions, Activity limitations: Decreased functional mobility   Assessment: Pt grossly CGA for mobility with Lissa to stand from low chair. pt amb 300' no AD CGA improving to SBA. Pt wiould benefit from acute PT to maximize safety. Prognosis: Good  Decision Making: Medium Complexity  Patient Education: PT POC, inspirometer use. REQUIRES PT FOLLOW UP: Yes  Activity Tolerance  Activity Tolerance: Patient Tolerated treatment well       Patient Diagnosis(es): The encounter diagnosis was Aortic occlusion ([facility] [address] has a past medical history of Hyperlipidemia and Hypertension. has a past surgical history that includes hernia repair (Bilateral) and vascular surgery (2019). Restrictions  Restrictions/Precautions  Restrictions/Precautions: General Precautions, Fall Risk  Required Braces or Orthoses?: No  Position Activity Restriction  Other position/activity restrictions: up with assist. s/p aortic B fem bypass + B fem thrombectomy   Vision/Hearing  Vision: Impaired  Vision Exceptions: Wears glasses for distance  Hearing: Within functional limits     Subjective  General  Chart Reviewed: Yes  Patient assessed for rehabilitation services?: Yes  Response To Previous Treatment: Not applicable  Family / Caregiver Present: Yes  Follows Commands: Within Functional Limits  Subjective  Subjective: RN and pt agreeable to PT.  Pt alert in bed upon arrival.   Pain Screening  Patient Currently in Pain: Yes  Pain

## 2019-07-21 NOTE — PROGRESS NOTES
Called Dr Sol Zuniga pt has modeling on feet, bilateral big toes turning blue. Pt denies pain in toes slight pain in abdomen since PT/Ot.  All pulses palpable

## 2019-07-22 LAB
ABO/RH: NORMAL
ABSOLUTE EOS #: 0 K/UL (ref 0–0.44)
ABSOLUTE IMMATURE GRANULOCYTE: 0.24 K/UL (ref 0–0.3)
ABSOLUTE LYMPH #: 2.16 K/UL (ref 1.1–3.7)
ABSOLUTE MONO #: 1.92 K/UL (ref 0.1–1.2)
ANION GAP SERPL CALCULATED.3IONS-SCNC: 11 MMOL/L (ref 9–17)
ANTIBODY SCREEN: NEGATIVE
ARM BAND NUMBER: NORMAL
BASOPHILS # BLD: 0 % (ref 0–2)
BASOPHILS ABSOLUTE: 0 K/UL (ref 0–0.2)
BLD PROD TYP BPU: NORMAL
BUN BLDV-MCNC: 10 MG/DL (ref 8–23)
BUN/CREAT BLD: ABNORMAL (ref 9–20)
CALCIUM SERPL-MCNC: 8.1 MG/DL (ref 8.6–10.4)
CHLORIDE BLD-SCNC: 102 MMOL/L (ref 98–107)
CO2: 23 MMOL/L (ref 20–31)
CREAT SERPL-MCNC: 0.87 MG/DL (ref 0.7–1.2)
CROSSMATCH RESULT: NORMAL
DIFFERENTIAL TYPE: ABNORMAL
DISPENSE STATUS BLOOD BANK: NORMAL
EKG ATRIAL RATE: 65 BPM
EKG P AXIS: 49 DEGREES
EKG P-R INTERVAL: 152 MS
EKG Q-T INTERVAL: 394 MS
EKG QRS DURATION: 80 MS
EKG QTC CALCULATION (BAZETT): 409 MS
EKG R AXIS: 51 DEGREES
EKG T AXIS: 54 DEGREES
EKG VENTRICULAR RATE: 65 BPM
EOSINOPHILS RELATIVE PERCENT: 0 % (ref 1–4)
EXPIRATION DATE: NORMAL
GFR AFRICAN AMERICAN: >60 ML/MIN
GFR NON-AFRICAN AMERICAN: >60 ML/MIN
GFR SERPL CREATININE-BSD FRML MDRD: ABNORMAL ML/MIN/{1.73_M2}
GFR SERPL CREATININE-BSD FRML MDRD: ABNORMAL ML/MIN/{1.73_M2}
GLUCOSE BLD-MCNC: 109 MG/DL (ref 70–99)
HCT VFR BLD CALC: 34.6 % (ref 40.7–50.3)
HEMOGLOBIN: 11.5 G/DL (ref 13–17)
IMMATURE GRANULOCYTES: 1 %
LV EF: 67 %
LVEF MODALITY: NORMAL
LYMPHOCYTES # BLD: 9 % (ref 24–43)
MCH RBC QN AUTO: 31.5 PG (ref 25.2–33.5)
MCHC RBC AUTO-ENTMCNC: 33.2 G/DL (ref 28.4–34.8)
MCV RBC AUTO: 94.8 FL (ref 82.6–102.9)
MONOCYTES # BLD: 8 % (ref 3–12)
MORPHOLOGY: NORMAL
NRBC AUTOMATED: 0 PER 100 WBC
PDW BLD-RTO: 12.6 % (ref 11.8–14.4)
PLATELET # BLD: 145 K/UL (ref 138–453)
PLATELET ESTIMATE: ABNORMAL
PMV BLD AUTO: 10.5 FL (ref 8.1–13.5)
POTASSIUM SERPL-SCNC: 3.6 MMOL/L (ref 3.7–5.3)
RBC # BLD: 3.65 M/UL (ref 4.21–5.77)
RBC # BLD: ABNORMAL 10*6/UL
SEG NEUTROPHILS: 82 % (ref 36–65)
SEGMENTED NEUTROPHILS ABSOLUTE COUNT: 19.68 K/UL (ref 1.5–8.1)
SODIUM BLD-SCNC: 136 MMOL/L (ref 135–144)
TRANSFUSION STATUS: NORMAL
UNIT DIVISION: 0
UNIT NUMBER: NORMAL
WBC # BLD: 24 K/UL (ref 3.5–11.3)
WBC # BLD: ABNORMAL 10*3/UL

## 2019-07-22 PROCEDURE — 85025 COMPLETE CBC W/AUTO DIFF WBC: CPT

## 2019-07-22 PROCEDURE — 6370000000 HC RX 637 (ALT 250 FOR IP): Performed by: STUDENT IN AN ORGANIZED HEALTH CARE EDUCATION/TRAINING PROGRAM

## 2019-07-22 PROCEDURE — 2500000003 HC RX 250 WO HCPCS: Performed by: STUDENT IN AN ORGANIZED HEALTH CARE EDUCATION/TRAINING PROGRAM

## 2019-07-22 PROCEDURE — 36415 COLL VENOUS BLD VENIPUNCTURE: CPT

## 2019-07-22 PROCEDURE — 6360000002 HC RX W HCPCS: Performed by: STUDENT IN AN ORGANIZED HEALTH CARE EDUCATION/TRAINING PROGRAM

## 2019-07-22 PROCEDURE — 2000000000 HC ICU R&B

## 2019-07-22 PROCEDURE — 97116 GAIT TRAINING THERAPY: CPT

## 2019-07-22 PROCEDURE — 80048 BASIC METABOLIC PNL TOTAL CA: CPT

## 2019-07-22 PROCEDURE — 97530 THERAPEUTIC ACTIVITIES: CPT

## 2019-07-22 PROCEDURE — 94760 N-INVAS EAR/PLS OXIMETRY 1: CPT

## 2019-07-22 PROCEDURE — 93010 ELECTROCARDIOGRAM REPORT: CPT | Performed by: INTERNAL MEDICINE

## 2019-07-22 PROCEDURE — 2580000003 HC RX 258: Performed by: STUDENT IN AN ORGANIZED HEALTH CARE EDUCATION/TRAINING PROGRAM

## 2019-07-22 PROCEDURE — 93306 TTE W/DOPPLER COMPLETE: CPT

## 2019-07-22 PROCEDURE — 97110 THERAPEUTIC EXERCISES: CPT

## 2019-07-22 RX ORDER — METOPROLOL TARTRATE 5 MG/5ML
5 INJECTION INTRAVENOUS EVERY 6 HOURS PRN
Status: DISCONTINUED | OUTPATIENT
Start: 2019-07-22 | End: 2019-07-31 | Stop reason: HOSPADM

## 2019-07-22 RX ORDER — ROSUVASTATIN CALCIUM 20 MG/1
20 TABLET, COATED ORAL NIGHTLY
Status: DISCONTINUED | OUTPATIENT
Start: 2019-07-22 | End: 2019-07-31 | Stop reason: HOSPADM

## 2019-07-22 RX ADMIN — FENTANYL CITRATE 25 MCG: 50 INJECTION, SOLUTION INTRAMUSCULAR; INTRAVENOUS at 07:35

## 2019-07-22 RX ADMIN — ASPIRIN 81 MG: 81 TABLET, CHEWABLE ORAL at 07:36

## 2019-07-22 RX ADMIN — ENOXAPARIN SODIUM 40 MG: 40 INJECTION SUBCUTANEOUS at 07:35

## 2019-07-22 RX ADMIN — OXYCODONE HYDROCHLORIDE 5 MG: 5 TABLET ORAL at 20:44

## 2019-07-22 RX ADMIN — FENTANYL CITRATE 50 MCG: 50 INJECTION INTRAMUSCULAR; INTRAVENOUS at 03:28

## 2019-07-22 RX ADMIN — FENTANYL CITRATE 50 MCG: 50 INJECTION INTRAMUSCULAR; INTRAVENOUS at 17:42

## 2019-07-22 RX ADMIN — SODIUM CHLORIDE, PRESERVATIVE FREE 10 ML: 5 INJECTION INTRAVENOUS at 20:49

## 2019-07-22 RX ADMIN — METOPROLOL TARTRATE 5 MG: 5 INJECTION, SOLUTION INTRAVENOUS at 20:44

## 2019-07-22 RX ADMIN — ACETAMINOPHEN 1000 MG: 500 TABLET ORAL at 13:10

## 2019-07-22 RX ADMIN — SODIUM CHLORIDE, POTASSIUM CHLORIDE, SODIUM LACTATE AND CALCIUM CHLORIDE: 600; 310; 30; 20 INJECTION, SOLUTION INTRAVENOUS at 06:44

## 2019-07-22 RX ADMIN — OXYCODONE HYDROCHLORIDE 5 MG: 5 TABLET ORAL at 15:54

## 2019-07-22 RX ADMIN — OXYCODONE HYDROCHLORIDE 5 MG: 5 TABLET ORAL at 10:27

## 2019-07-22 RX ADMIN — FAMOTIDINE 20 MG: 10 INJECTION, SOLUTION INTRAVENOUS at 07:36

## 2019-07-22 RX ADMIN — FAMOTIDINE 20 MG: 10 INJECTION, SOLUTION INTRAVENOUS at 21:10

## 2019-07-22 RX ADMIN — ACETAMINOPHEN 1000 MG: 500 TABLET ORAL at 21:12

## 2019-07-22 RX ADMIN — ACETAMINOPHEN 1000 MG: 500 TABLET ORAL at 04:32

## 2019-07-22 RX ADMIN — DOCUSATE SODIUM 100 MG: 100 CAPSULE, LIQUID FILLED ORAL at 21:10

## 2019-07-22 RX ADMIN — SODIUM CHLORIDE, PRESERVATIVE FREE 10 ML: 5 INJECTION INTRAVENOUS at 07:37

## 2019-07-22 RX ADMIN — OXYCODONE HYDROCHLORIDE 5 MG: 5 TABLET ORAL at 04:33

## 2019-07-22 RX ADMIN — ROSUVASTATIN CALCIUM 20 MG: 20 TABLET, FILM COATED ORAL at 21:10

## 2019-07-22 RX ADMIN — FENTANYL CITRATE 50 MCG: 50 INJECTION INTRAMUSCULAR; INTRAVENOUS at 13:46

## 2019-07-22 ASSESSMENT — PAIN SCALES - GENERAL
PAINLEVEL_OUTOF10: 2
PAINLEVEL_OUTOF10: 3
PAINLEVEL_OUTOF10: 0
PAINLEVEL_OUTOF10: 5
PAINLEVEL_OUTOF10: 6
PAINLEVEL_OUTOF10: 3
PAINLEVEL_OUTOF10: 7
PAINLEVEL_OUTOF10: 7
PAINLEVEL_OUTOF10: 5
PAINLEVEL_OUTOF10: 4
PAINLEVEL_OUTOF10: 7
PAINLEVEL_OUTOF10: 0
PAINLEVEL_OUTOF10: 5
PAINLEVEL_OUTOF10: 0
PAINLEVEL_OUTOF10: 4
PAINLEVEL_OUTOF10: 6
PAINLEVEL_OUTOF10: 0
PAINLEVEL_OUTOF10: 0

## 2019-07-22 ASSESSMENT — PAIN DESCRIPTION - DESCRIPTORS: DESCRIPTORS: ACHING;DISCOMFORT

## 2019-07-22 ASSESSMENT — PAIN DESCRIPTION - ORIENTATION: ORIENTATION: MID

## 2019-07-22 ASSESSMENT — PAIN DESCRIPTION - FREQUENCY: FREQUENCY: CONTINUOUS

## 2019-07-22 ASSESSMENT — PAIN DESCRIPTION - LOCATION: LOCATION: ABDOMEN

## 2019-07-22 ASSESSMENT — PAIN - FUNCTIONAL ASSESSMENT: PAIN_FUNCTIONAL_ASSESSMENT: PREVENTS OR INTERFERES SOME ACTIVE ACTIVITIES AND ADLS

## 2019-07-22 NOTE — PROGRESS NOTES
Vascular Surgery Progress Note         PATIENT NAME: Iban cMcrary     TODAY'S DATE: 7/22/2019, 7:24 AM    SUBJECTIVE:    Pt seen and examined at bedside this morning. No acute events overnight. Afebrile. Patient out of bed ambulating. Patient reports he is having some mild incisional site pain this morning. Patient reports he walked multiple labs in the hallway yesterday and was out of bed most today. Patient reports he feels sore, he feels like he likely overdid his workout yesterday. Patient is making urine-0.4 mL/Kg per hour overnight. Patient tolerating ice chips with NG tube in place, NG output 400 mL, likely all from ice chips. No sign of bilious drainage in the NG. Patient reports she is still not passed any gas or have a bowel movement. Patient has been using his incentive spirometer. OBJECTIVE:   Vitals:  /65   Pulse 103   Temp 98.4 °F (36.9 °C) (Oral)   Resp 15   Ht 5' 9\" (1.753 m)   Wt 177 lb 0.5 oz (80.3 kg)   SpO2 95%   BMI 26.14 kg/m²      INTAKE/OUTPUT:      Intake/Output Summary (Last 24 hours) at 7/22/2019 0724  Last data filed at 7/22/2019 0100  Gross per 24 hour   Intake 2659.75 ml   Output 830 ml   Net 1829.75 ml                 GENERAL: AOx3, NAD  HEENT: EOMI bilaterally  CARDIAC: Regular rate and rhythm. ABDOMEN: Soft, NT, ND, midline incision staples intact, minimal bleeding, dressing exchanged  EXTREMITY: moves all extremities, no edema. Bilateral lower extremities with no signs of ischemia or mottling of skin. Bilateral lower extremities with palpable DP/PT bilateral.  NEURO: CN II-XII intact. Gross motor intact. INCISION: Dressing clean, dry, intact.     Data:  CBC with Differential:    Lab Results   Component Value Date    WBC 26.1 07/21/2019    RBC 4.10 07/21/2019    HGB 12.7 07/21/2019    HCT 38.2 07/21/2019     07/21/2019    MCV 93.2 07/21/2019    MCH 31.0 07/21/2019    MCHC 33.2 07/21/2019    RDW 12.8 07/21/2019    LYMPHOPCT 1 07/21/2019 MONOPCT 6 07/21/2019    BASOPCT 0 07/21/2019    MONOSABS 1.57 07/21/2019    LYMPHSABS 0.26 07/21/2019    EOSABS 0.00 07/21/2019    BASOSABS 0.00 07/21/2019    DIFFTYPE NOT REPORTED 07/21/2019     BMP:    Lab Results   Component Value Date     07/21/2019    K 4.0 07/21/2019     07/21/2019    CO2 23 07/21/2019    BUN 9 07/21/2019    CREATININE 0.88 07/21/2019    CALCIUM 7.7 07/21/2019    GFRAA >60 07/21/2019    LABGLOM >60 07/21/2019    GLUCOSE 143 07/21/2019         ASSESSMENT   3 70-year-old male postop day 2 status post open aortobifemoral bypass. PLAN  1. Neuro-pain control, oral Patricia, IV fentanyl every hour as needed, continue scheduled Tylenol  2. Cardiac-regular rate and rhythm, history of hypertension, will obtain 2D echo today. Continue baby aspirin down NG tube  3. Pulmonary-clear to auscultation, saturations fine on room air, encourage incentive spirometer and pt to be oob to ambulate   4. Renal-UOP- 0.4 mL/kg/hr overnight, LR running at 75 mL/hr, Guillen out 7/21  5. GI- NPO, NG tube clamp trial this morning, if tolerates will remove at 12- 1 pm today,  Continue colace. 6. MSK- Patient to be out of bed to ambulate today, up to chair. 7. Continue Lovenox, ASA 81 mg  8. Continue q. one hour neurovascular checks, patient currently has palpable bilateral DP/PT pulses. 9. PT/OT eval and treat   10.  Will F/U morning labs         Electronically signed by Kat Morrow DO  on 7/22/2019 at 7:24 AM

## 2019-07-23 LAB
ABSOLUTE EOS #: 0 K/UL (ref 0–0.4)
ABSOLUTE IMMATURE GRANULOCYTE: 0 K/UL (ref 0–0.3)
ABSOLUTE LYMPH #: 1.43 K/UL (ref 1–4.8)
ABSOLUTE MONO #: 1.67 K/UL (ref 0.1–0.8)
ANION GAP SERPL CALCULATED.3IONS-SCNC: 17 MMOL/L (ref 9–17)
BASOPHILS # BLD: 0 % (ref 0–2)
BASOPHILS ABSOLUTE: 0 K/UL (ref 0–0.2)
BUN BLDV-MCNC: 10 MG/DL (ref 8–23)
BUN/CREAT BLD: ABNORMAL (ref 9–20)
CALCIUM SERPL-MCNC: 8.2 MG/DL (ref 8.6–10.4)
CHLORIDE BLD-SCNC: 97 MMOL/L (ref 98–107)
CO2: 20 MMOL/L (ref 20–31)
CREAT SERPL-MCNC: 0.81 MG/DL (ref 0.7–1.2)
DIFFERENTIAL TYPE: ABNORMAL
EOSINOPHILS RELATIVE PERCENT: 0 % (ref 1–4)
GFR AFRICAN AMERICAN: >60 ML/MIN
GFR NON-AFRICAN AMERICAN: >60 ML/MIN
GFR SERPL CREATININE-BSD FRML MDRD: ABNORMAL ML/MIN/{1.73_M2}
GFR SERPL CREATININE-BSD FRML MDRD: ABNORMAL ML/MIN/{1.73_M2}
GLUCOSE BLD-MCNC: 102 MG/DL (ref 70–99)
HCT VFR BLD CALC: 36.3 % (ref 40.7–50.3)
HEMOGLOBIN: 11.9 G/DL (ref 13–17)
IMMATURE GRANULOCYTES: 0 %
LV EF: 55 %
LVEF MODALITY: NORMAL
LYMPHOCYTES # BLD: 6 % (ref 24–44)
MCH RBC QN AUTO: 31.3 PG (ref 25.2–33.5)
MCHC RBC AUTO-ENTMCNC: 32.8 G/DL (ref 28.4–34.8)
MCV RBC AUTO: 95.5 FL (ref 82.6–102.9)
MONOCYTES # BLD: 7 % (ref 1–7)
MORPHOLOGY: NORMAL
NRBC AUTOMATED: 0 PER 100 WBC
PDW BLD-RTO: 12.4 % (ref 11.8–14.4)
PLATELET # BLD: 166 K/UL (ref 138–453)
PLATELET ESTIMATE: ABNORMAL
PMV BLD AUTO: 10.7 FL (ref 8.1–13.5)
POTASSIUM SERPL-SCNC: 3.7 MMOL/L (ref 3.7–5.3)
RBC # BLD: 3.8 M/UL (ref 4.21–5.77)
RBC # BLD: ABNORMAL 10*6/UL
SEG NEUTROPHILS: 87 % (ref 36–66)
SEGMENTED NEUTROPHILS ABSOLUTE COUNT: 20.7 K/UL (ref 1.8–7.7)
SODIUM BLD-SCNC: 134 MMOL/L (ref 135–144)
WBC # BLD: 23.8 K/UL (ref 3.5–11.3)
WBC # BLD: ABNORMAL 10*3/UL

## 2019-07-23 PROCEDURE — 6370000000 HC RX 637 (ALT 250 FOR IP): Performed by: STUDENT IN AN ORGANIZED HEALTH CARE EDUCATION/TRAINING PROGRAM

## 2019-07-23 PROCEDURE — 2500000003 HC RX 250 WO HCPCS: Performed by: STUDENT IN AN ORGANIZED HEALTH CARE EDUCATION/TRAINING PROGRAM

## 2019-07-23 PROCEDURE — 6360000002 HC RX W HCPCS: Performed by: STUDENT IN AN ORGANIZED HEALTH CARE EDUCATION/TRAINING PROGRAM

## 2019-07-23 PROCEDURE — 2580000003 HC RX 258: Performed by: STUDENT IN AN ORGANIZED HEALTH CARE EDUCATION/TRAINING PROGRAM

## 2019-07-23 PROCEDURE — 93312 ECHO TRANSESOPHAGEAL: CPT

## 2019-07-23 PROCEDURE — 80048 BASIC METABOLIC PNL TOTAL CA: CPT

## 2019-07-23 PROCEDURE — B246ZZ4 ULTRASONOGRAPHY OF RIGHT AND LEFT HEART, TRANSESOPHAGEAL: ICD-10-PCS | Performed by: INTERNAL MEDICINE

## 2019-07-23 PROCEDURE — 2000000000 HC ICU R&B

## 2019-07-23 PROCEDURE — 85025 COMPLETE CBC W/AUTO DIFF WBC: CPT

## 2019-07-23 PROCEDURE — 36415 COLL VENOUS BLD VENIPUNCTURE: CPT

## 2019-07-23 PROCEDURE — 6360000002 HC RX W HCPCS

## 2019-07-23 PROCEDURE — 2709999900 HC NON-CHARGEABLE SUPPLY

## 2019-07-23 PROCEDURE — 93325 DOPPLER ECHO COLOR FLOW MAPG: CPT

## 2019-07-23 PROCEDURE — 94760 N-INVAS EAR/PLS OXIMETRY 1: CPT

## 2019-07-23 RX ORDER — PROMETHAZINE HYDROCHLORIDE 25 MG/ML
25 INJECTION, SOLUTION INTRAMUSCULAR; INTRAVENOUS ONCE
Status: DISCONTINUED | OUTPATIENT
Start: 2019-07-23 | End: 2019-07-23

## 2019-07-23 RX ORDER — PROMETHAZINE HYDROCHLORIDE 25 MG/ML
25 INJECTION, SOLUTION INTRAMUSCULAR; INTRAVENOUS ONCE
Status: COMPLETED | OUTPATIENT
Start: 2019-07-23 | End: 2019-07-23

## 2019-07-23 RX ORDER — SODIUM CHLORIDE 0.9 % (FLUSH) 0.9 %
10 SYRINGE (ML) INJECTION EVERY 12 HOURS SCHEDULED
Status: DISCONTINUED | OUTPATIENT
Start: 2019-07-23 | End: 2019-07-31 | Stop reason: HOSPADM

## 2019-07-23 RX ORDER — SODIUM CHLORIDE 0.9 % (FLUSH) 0.9 %
10 SYRINGE (ML) INJECTION PRN
Status: DISCONTINUED | OUTPATIENT
Start: 2019-07-23 | End: 2019-07-31 | Stop reason: HOSPADM

## 2019-07-23 RX ADMIN — ENOXAPARIN SODIUM 40 MG: 40 INJECTION SUBCUTANEOUS at 11:14

## 2019-07-23 RX ADMIN — ASPIRIN 81 MG: 81 TABLET, CHEWABLE ORAL at 11:09

## 2019-07-23 RX ADMIN — SODIUM CHLORIDE, PRESERVATIVE FREE 10 ML: 5 INJECTION INTRAVENOUS at 11:13

## 2019-07-23 RX ADMIN — ONDANSETRON 4 MG: 2 INJECTION INTRAMUSCULAR; INTRAVENOUS at 05:55

## 2019-07-23 RX ADMIN — SODIUM CHLORIDE, POTASSIUM CHLORIDE, SODIUM LACTATE AND CALCIUM CHLORIDE: 600; 310; 30; 20 INJECTION, SOLUTION INTRAVENOUS at 03:20

## 2019-07-23 RX ADMIN — OXYCODONE HYDROCHLORIDE 5 MG: 5 TABLET ORAL at 20:11

## 2019-07-23 RX ADMIN — ONDANSETRON 4 MG: 2 INJECTION INTRAMUSCULAR; INTRAVENOUS at 00:29

## 2019-07-23 RX ADMIN — ACETAMINOPHEN 1000 MG: 500 TABLET ORAL at 20:10

## 2019-07-23 RX ADMIN — ROSUVASTATIN CALCIUM 20 MG: 20 TABLET, FILM COATED ORAL at 20:10

## 2019-07-23 RX ADMIN — OXYCODONE HYDROCHLORIDE 5 MG: 5 TABLET ORAL at 11:18

## 2019-07-23 RX ADMIN — PROMETHAZINE HYDROCHLORIDE 25 MG: 25 INJECTION INTRAMUSCULAR; INTRAVENOUS at 07:51

## 2019-07-23 RX ADMIN — ACETAMINOPHEN 1000 MG: 500 TABLET ORAL at 14:32

## 2019-07-23 RX ADMIN — FENTANYL CITRATE 50 MCG: 50 INJECTION INTRAMUSCULAR; INTRAVENOUS at 00:40

## 2019-07-23 RX ADMIN — DOCUSATE SODIUM 100 MG: 100 CAPSULE, LIQUID FILLED ORAL at 20:10

## 2019-07-23 RX ADMIN — DOCUSATE SODIUM 100 MG: 100 CAPSULE, LIQUID FILLED ORAL at 11:09

## 2019-07-23 RX ADMIN — ONDANSETRON 4 MG: 2 INJECTION INTRAMUSCULAR; INTRAVENOUS at 20:16

## 2019-07-23 RX ADMIN — FAMOTIDINE 20 MG: 10 INJECTION, SOLUTION INTRAVENOUS at 11:13

## 2019-07-23 RX ADMIN — METOPROLOL TARTRATE 5 MG: 5 INJECTION, SOLUTION INTRAVENOUS at 03:27

## 2019-07-23 RX ADMIN — FAMOTIDINE 20 MG: 10 INJECTION, SOLUTION INTRAVENOUS at 20:11

## 2019-07-23 ASSESSMENT — PAIN SCALES - GENERAL
PAINLEVEL_OUTOF10: 0
PAINLEVEL_OUTOF10: 4
PAINLEVEL_OUTOF10: 0
PAINLEVEL_OUTOF10: 0
PAINLEVEL_OUTOF10: 7
PAINLEVEL_OUTOF10: 6
PAINLEVEL_OUTOF10: 4
PAINLEVEL_OUTOF10: 6

## 2019-07-23 ASSESSMENT — PAIN DESCRIPTION - LOCATION
LOCATION: ABDOMEN
LOCATION: ABDOMEN;GROIN

## 2019-07-23 ASSESSMENT — PAIN DESCRIPTION - PAIN TYPE
TYPE: ACUTE PAIN;SURGICAL PAIN
TYPE: ACUTE PAIN;SURGICAL PAIN

## 2019-07-23 ASSESSMENT — PAIN DESCRIPTION - DESCRIPTORS: DESCRIPTORS: ACHING

## 2019-07-23 ASSESSMENT — PAIN DESCRIPTION - ORIENTATION
ORIENTATION: MID
ORIENTATION: MID

## 2019-07-23 NOTE — OP NOTE
examined the patient and the key elements of the encounter have been performed by me. I agree with the assessment, plan and orders as documented by the resident With changes made to the note. Procedure performed by me.     Electronically signed by Huey Da Silva MD on 8/1/2019 at 4:50 PM.    [facility]      [phone]

## 2019-07-23 NOTE — PROGRESS NOTES
Vascular Surgery Progress Note         PATIENT NAME: Mau Rosales     TODAY'S DATE: 7/23/2019, 7:17 AM    SUBJECTIVE:    Pt seen and examined at bedside this morning. No acute events overnight. Afebrile. Patient reports that this morning he started experiencing worsening nausea but no vomiting. Patient reports his pain is well controlled. Patient has been tolerating sips of liquids and ice chips. Patient's been out of bed ambulate without difficulty. Midline incision site with staples intact, no strikethrough bleeding. Groin sites healing well. OBJECTIVE:   Vitals:  BP (!) 141/76   Pulse 93   Temp 98.5 °F (36.9 °C) (Oral)   Resp 16   Ht 5' 9\" (1.753 m)   Wt 180 lb 1.9 oz (81.7 kg)   SpO2 97%   BMI 26.60 kg/m²      INTAKE/OUTPUT:      Intake/Output Summary (Last 24 hours) at 7/23/2019 0717  Last data filed at 7/23/2019 0600  Gross per 24 hour   Intake 1045 ml   Output 995 ml   Net 50 ml                 GENERAL: AOx3, NAD  HEENT: EOMI bilaterally  CARDIAC: Regular rate and rhythm. ABDOMEN: Soft, NT, ND, Active Bowel Sounds, midline incision with staples intact, no strikethrough bleeding, no drainage noted  EXTREMITY: moves all extremities, no edema. Pulses palpable bilateral radial, femoral, DP/PT  NEURO: CN II-XII intact. Gross motor intact. INCISION: Dressing clean, dry, intact.     Data:  CBC with Differential:    Lab Results   Component Value Date    WBC 23.8 07/23/2019    RBC 3.80 07/23/2019    HGB 11.9 07/23/2019    HCT 36.3 07/23/2019     07/23/2019    MCV 95.5 07/23/2019    MCH 31.3 07/23/2019    MCHC 32.8 07/23/2019    RDW 12.4 07/23/2019    LYMPHOPCT 6 07/23/2019    MONOPCT 7 07/23/2019    BASOPCT 0 07/23/2019    MONOSABS 1.67 07/23/2019    LYMPHSABS 1.43 07/23/2019    EOSABS 0.00 07/23/2019    BASOSABS 0.00 07/23/2019    DIFFTYPE NOT REPORTED 07/23/2019     BMP:    Lab Results   Component Value Date     07/23/2019    K 3.7 07/23/2019    CL 97 07/23/2019    CO2 20

## 2019-07-24 ENCOUNTER — APPOINTMENT (OUTPATIENT)
Dept: GENERAL RADIOLOGY | Age: 59
DRG: 271 | End: 2019-07-24
Payer: COMMERCIAL

## 2019-07-24 LAB
ABSOLUTE EOS #: 0.16 K/UL (ref 0–0.4)
ABSOLUTE IMMATURE GRANULOCYTE: 0.16 K/UL (ref 0–0.3)
ABSOLUTE LYMPH #: 1.9 K/UL (ref 1–4.8)
ABSOLUTE MONO #: 0.79 K/UL (ref 0.1–0.8)
ANION GAP SERPL CALCULATED.3IONS-SCNC: 15 MMOL/L (ref 9–17)
BASOPHILS # BLD: 0 % (ref 0–2)
BASOPHILS ABSOLUTE: 0 K/UL (ref 0–0.2)
BUN BLDV-MCNC: 12 MG/DL (ref 8–23)
BUN/CREAT BLD: ABNORMAL (ref 9–20)
CALCIUM SERPL-MCNC: 8 MG/DL (ref 8.6–10.4)
CHLORIDE BLD-SCNC: 99 MMOL/L (ref 98–107)
CO2: 22 MMOL/L (ref 20–31)
CREAT SERPL-MCNC: 0.88 MG/DL (ref 0.7–1.2)
DIFFERENTIAL TYPE: ABNORMAL
EOSINOPHILS RELATIVE PERCENT: 1 % (ref 1–4)
GFR AFRICAN AMERICAN: >60 ML/MIN
GFR NON-AFRICAN AMERICAN: >60 ML/MIN
GFR SERPL CREATININE-BSD FRML MDRD: ABNORMAL ML/MIN/{1.73_M2}
GFR SERPL CREATININE-BSD FRML MDRD: ABNORMAL ML/MIN/{1.73_M2}
GLUCOSE BLD-MCNC: 92 MG/DL (ref 70–99)
HCT VFR BLD CALC: 33.9 % (ref 40.7–50.3)
HEMOGLOBIN: 11.1 G/DL (ref 13–17)
IMMATURE GRANULOCYTES: 1 %
LYMPHOCYTES # BLD: 12 % (ref 24–44)
MCH RBC QN AUTO: 31 PG (ref 25.2–33.5)
MCHC RBC AUTO-ENTMCNC: 32.7 G/DL (ref 28.4–34.8)
MCV RBC AUTO: 94.7 FL (ref 82.6–102.9)
MONOCYTES # BLD: 5 % (ref 1–7)
MORPHOLOGY: NORMAL
NRBC AUTOMATED: 0 PER 100 WBC
PDW BLD-RTO: 12.5 % (ref 11.8–14.4)
PLATELET # BLD: 181 K/UL (ref 138–453)
PLATELET ESTIMATE: ABNORMAL
PMV BLD AUTO: 10.5 FL (ref 8.1–13.5)
POTASSIUM SERPL-SCNC: 4 MMOL/L (ref 3.7–5.3)
RBC # BLD: 3.58 M/UL (ref 4.21–5.77)
RBC # BLD: ABNORMAL 10*6/UL
SEG NEUTROPHILS: 81 % (ref 36–66)
SEGMENTED NEUTROPHILS ABSOLUTE COUNT: 12.79 K/UL (ref 1.8–7.7)
SODIUM BLD-SCNC: 136 MMOL/L (ref 135–144)
WBC # BLD: 15.8 K/UL (ref 3.5–11.3)
WBC # BLD: ABNORMAL 10*3/UL

## 2019-07-24 PROCEDURE — 6370000000 HC RX 637 (ALT 250 FOR IP): Performed by: STUDENT IN AN ORGANIZED HEALTH CARE EDUCATION/TRAINING PROGRAM

## 2019-07-24 PROCEDURE — 2580000003 HC RX 258: Performed by: STUDENT IN AN ORGANIZED HEALTH CARE EDUCATION/TRAINING PROGRAM

## 2019-07-24 PROCEDURE — 2000000000 HC ICU R&B

## 2019-07-24 PROCEDURE — 74018 RADEX ABDOMEN 1 VIEW: CPT

## 2019-07-24 PROCEDURE — 6360000002 HC RX W HCPCS: Performed by: STUDENT IN AN ORGANIZED HEALTH CARE EDUCATION/TRAINING PROGRAM

## 2019-07-24 PROCEDURE — 6370000000 HC RX 637 (ALT 250 FOR IP)

## 2019-07-24 PROCEDURE — 36415 COLL VENOUS BLD VENIPUNCTURE: CPT

## 2019-07-24 PROCEDURE — 2500000003 HC RX 250 WO HCPCS: Performed by: STUDENT IN AN ORGANIZED HEALTH CARE EDUCATION/TRAINING PROGRAM

## 2019-07-24 PROCEDURE — 97535 SELF CARE MNGMENT TRAINING: CPT

## 2019-07-24 PROCEDURE — 80048 BASIC METABOLIC PNL TOTAL CA: CPT

## 2019-07-24 PROCEDURE — 85025 COMPLETE CBC W/AUTO DIFF WBC: CPT

## 2019-07-24 RX ORDER — PROMETHAZINE HYDROCHLORIDE 25 MG/ML
12.5 INJECTION, SOLUTION INTRAMUSCULAR; INTRAVENOUS EVERY 6 HOURS PRN
Status: DISCONTINUED | OUTPATIENT
Start: 2019-07-24 | End: 2019-07-31 | Stop reason: HOSPADM

## 2019-07-24 RX ORDER — BISACODYL 10 MG
10 SUPPOSITORY, RECTAL RECTAL ONCE
Status: COMPLETED | OUTPATIENT
Start: 2019-07-25 | End: 2019-07-25

## 2019-07-24 RX ORDER — DEXTROSE, SODIUM CHLORIDE, AND POTASSIUM CHLORIDE 5; .45; .15 G/100ML; G/100ML; G/100ML
INJECTION INTRAVENOUS CONTINUOUS
Status: DISCONTINUED | OUTPATIENT
Start: 2019-07-24 | End: 2019-07-31 | Stop reason: HOSPADM

## 2019-07-24 RX ORDER — ACETAMINOPHEN 500 MG
TABLET ORAL
Status: COMPLETED
Start: 2019-07-24 | End: 2019-07-24

## 2019-07-24 RX ADMIN — SODIUM CHLORIDE, PRESERVATIVE FREE 10 ML: 5 INJECTION INTRAVENOUS at 08:32

## 2019-07-24 RX ADMIN — ACETAMINOPHEN 1000 MG: 500 TABLET ORAL at 22:00

## 2019-07-24 RX ADMIN — DOCUSATE SODIUM 100 MG: 100 CAPSULE, LIQUID FILLED ORAL at 22:00

## 2019-07-24 RX ADMIN — DOCUSATE SODIUM 100 MG: 100 CAPSULE, LIQUID FILLED ORAL at 08:23

## 2019-07-24 RX ADMIN — ROSUVASTATIN CALCIUM 20 MG: 20 TABLET, FILM COATED ORAL at 22:00

## 2019-07-24 RX ADMIN — POTASSIUM CHLORIDE, DEXTROSE MONOHYDRATE AND SODIUM CHLORIDE: 150; 5; 450 INJECTION, SOLUTION INTRAVENOUS at 23:35

## 2019-07-24 RX ADMIN — ONDANSETRON 4 MG: 2 INJECTION INTRAMUSCULAR; INTRAVENOUS at 17:17

## 2019-07-24 RX ADMIN — ASPIRIN 81 MG: 81 TABLET, CHEWABLE ORAL at 08:23

## 2019-07-24 RX ADMIN — OXYCODONE HYDROCHLORIDE 5 MG: 5 TABLET ORAL at 12:52

## 2019-07-24 RX ADMIN — SODIUM CHLORIDE, PRESERVATIVE FREE 10 ML: 5 INJECTION INTRAVENOUS at 08:26

## 2019-07-24 RX ADMIN — PROMETHAZINE HYDROCHLORIDE 12.5 MG: 25 INJECTION INTRAMUSCULAR; INTRAVENOUS at 21:01

## 2019-07-24 RX ADMIN — FAMOTIDINE 20 MG: 10 INJECTION, SOLUTION INTRAVENOUS at 08:23

## 2019-07-24 RX ADMIN — ACETAMINOPHEN 1000 MG: 500 TABLET ORAL at 05:26

## 2019-07-24 RX ADMIN — ACETAMINOPHEN 1000 MG: 500 TABLET ORAL at 12:53

## 2019-07-24 RX ADMIN — FAMOTIDINE 20 MG: 10 INJECTION, SOLUTION INTRAVENOUS at 22:00

## 2019-07-24 RX ADMIN — ENOXAPARIN SODIUM 40 MG: 40 INJECTION SUBCUTANEOUS at 08:23

## 2019-07-24 RX ADMIN — SODIUM CHLORIDE, POTASSIUM CHLORIDE, SODIUM LACTATE AND CALCIUM CHLORIDE: 600; 310; 30; 20 INJECTION, SOLUTION INTRAVENOUS at 02:51

## 2019-07-24 ASSESSMENT — PAIN SCALES - GENERAL
PAINLEVEL_OUTOF10: 0
PAINLEVEL_OUTOF10: 4
PAINLEVEL_OUTOF10: 3
PAINLEVEL_OUTOF10: 3
PAINLEVEL_OUTOF10: 2
PAINLEVEL_OUTOF10: 3
PAINLEVEL_OUTOF10: 5
PAINLEVEL_OUTOF10: 5

## 2019-07-24 ASSESSMENT — PAIN DESCRIPTION - ORIENTATION: ORIENTATION: MID

## 2019-07-24 ASSESSMENT — PAIN DESCRIPTION - DESCRIPTORS
DESCRIPTORS: ACHING
DESCRIPTORS: ACHING

## 2019-07-24 ASSESSMENT — PAIN DESCRIPTION - LOCATION
LOCATION: ABDOMEN
LOCATION: ABDOMEN;GROIN

## 2019-07-24 ASSESSMENT — PAIN DESCRIPTION - PAIN TYPE
TYPE: ACUTE PAIN;SURGICAL PAIN
TYPE: ACUTE PAIN
TYPE: SURGICAL PAIN

## 2019-07-24 ASSESSMENT — PAIN DESCRIPTION - FREQUENCY: FREQUENCY: CONTINUOUS

## 2019-07-24 ASSESSMENT — PAIN SCALES - WONG BAKER: WONGBAKER_NUMERICALRESPONSE: 0

## 2019-07-24 NOTE — PROGRESS NOTES
Vascular Surgery Progress Note         PATIENT NAME: Viviana Carrillo     TODAY'S DATE: 7/24/2019, 6:56 AM    SUBJECTIVE:    Pt seen and examined at bedside this morning. No acute events overnight. Afebrile. Patient reports that his nausea/abdominal pain is much better this morning. Patient reports he passed a small amount of gas overnight, he reports that he feels he may need to have a bowel movement this morning. Patient reports his pain is well controlled. Patient has been out of bed ambulating without difficulty. Midline incision healing well, staples intact. OBJECTIVE:   Vitals:  /73   Pulse 83   Temp 97.9 °F (36.6 °C) (Oral)   Resp 16   Ht 5' 9\" (1.753 m)   Wt 180 lb 1.9 oz (81.7 kg)   SpO2 97%   BMI 26.60 kg/m²      INTAKE/OUTPUT:      Intake/Output Summary (Last 24 hours) at 7/24/2019 0656  Last data filed at 7/23/2019 1755  Gross per 24 hour   Intake 1000 ml   Output --   Net 1000 ml                 GENERAL: AOx3, NAD  HEENT: EOMI bilaterally  CARDIAC: Regular rate and rhythm. ABDOMEN: Soft, NT, ND, Active Bowel Sounds, midline incision intact with staples present, no noted bleeding or drainage  EXTREMITY: moves all extremities, distal extremities with no signs of ischemia, bilateral palpable radial, femoral, DP/PT pulses  NEURO: CN II-XII intact. Gross motor intact. INCISION: Dressing clean, dry, intact.     Data:  CBC with Differential:    Lab Results   Component Value Date    WBC 15.8 07/24/2019    RBC 3.58 07/24/2019    HGB 11.1 07/24/2019    HCT 33.9 07/24/2019     07/24/2019    MCV 94.7 07/24/2019    MCH 31.0 07/24/2019    MCHC 32.7 07/24/2019    RDW 12.5 07/24/2019    LYMPHOPCT 12 07/24/2019    MONOPCT 5 07/24/2019    BASOPCT 0 07/24/2019    MONOSABS 0.79 07/24/2019    LYMPHSABS 1.90 07/24/2019    EOSABS 0.16 07/24/2019    BASOSABS 0.00 07/24/2019    DIFFTYPE NOT REPORTED 07/24/2019     BMP:    Lab Results   Component Value Date     07/24/2019    K 4.0 07/24/2019

## 2019-07-24 NOTE — PROGRESS NOTES
Occupational Therapy  Facility/Department: [facility] CAR 1  Daily Treatment Note  NAME: Damion Maher  : 1960  MRN: [id number]    Date of Service: 2019    Discharge Recommendations: No therapy recommended at discharge. Assessment   Performance deficits / Impairments: Decreased functional mobility ; Decreased safe awareness;Decreased balance;Decreased ADL status; Decreased high-level IADLs;Decreased endurance;Decreased strength  Treatment Diagnosis: Bilat Aortic Fem Bypass and Bilat Fem Thrombectomy 19  Prognosis: Good  Patient Education: OT POC  REQUIRES OT FOLLOW UP: Yes  Activity Tolerance  Activity Tolerance: Patient Tolerated treatment well;Patient limited by pain  Safety Devices  Safety Devices in place: Yes  Type of devices: All fall risk precautions in place;Call light within reach; Left in chair;Nurse notified         Patient Diagnosis(es): The encounter diagnosis was Aortic occlusion ([address] has a past medical history of Hyperlipidemia and Hypertension. has a past surgical history that includes hernia repair (Bilateral) and vascular surgery (2019). Restrictions  Restrictions/Precautions  Restrictions/Precautions: General Precautions, Surgical Protocols, Up as Tolerated  Required Braces or Orthoses?: No  Position Activity Restriction  Other position/activity restrictions: Up with assist. s/p Bilat aortic fem bypass + B fem thrombectomy 19  Subjective   General  Patient assessed for rehabilitation services?: Yes  Family / Caregiver Present: Yes (wife & son )  Pain Assessment  Pain Assessment: 0-10  Pain Level: 3  Pain Type: Surgical pain  Pain Location: Abdomen  Pain Orientation: Mid  Pain Descriptors: Aching  Pain Frequency: Continuous  Response to Pain Intervention: Patient Satisfied  Vital Signs  Patient Currently in Pain: Yes   Orientation     Objective    ADL  Feeding: Independent  Grooming: Stand by assistance;Setup; Increased time to complete(Seated to wash hair

## 2019-07-25 ENCOUNTER — APPOINTMENT (OUTPATIENT)
Dept: GENERAL RADIOLOGY | Age: 59
DRG: 271 | End: 2019-07-25
Payer: COMMERCIAL

## 2019-07-25 LAB
ABSOLUTE EOS #: 0 K/UL (ref 0–0.4)
ABSOLUTE IMMATURE GRANULOCYTE: 0 K/UL (ref 0–0.3)
ABSOLUTE LYMPH #: 1.29 K/UL (ref 1–4.8)
ABSOLUTE MONO #: 0.65 K/UL (ref 0.1–0.8)
ANION GAP SERPL CALCULATED.3IONS-SCNC: 17 MMOL/L (ref 9–17)
BASOPHILS # BLD: 0 % (ref 0–2)
BASOPHILS ABSOLUTE: 0 K/UL (ref 0–0.2)
BUN BLDV-MCNC: 9 MG/DL (ref 8–23)
BUN/CREAT BLD: ABNORMAL (ref 9–20)
CALCIUM IONIZED: 1.06 MMOL/L (ref 1.13–1.33)
CALCIUM SERPL-MCNC: 7.8 MG/DL (ref 8.6–10.4)
CHLORIDE BLD-SCNC: 98 MMOL/L (ref 98–107)
CO2: 19 MMOL/L (ref 20–31)
CREAT SERPL-MCNC: 0.74 MG/DL (ref 0.7–1.2)
DIFFERENTIAL TYPE: ABNORMAL
EOSINOPHILS RELATIVE PERCENT: 0 % (ref 1–4)
GFR AFRICAN AMERICAN: >60 ML/MIN
GFR NON-AFRICAN AMERICAN: >60 ML/MIN
GFR SERPL CREATININE-BSD FRML MDRD: ABNORMAL ML/MIN/{1.73_M2}
GFR SERPL CREATININE-BSD FRML MDRD: ABNORMAL ML/MIN/{1.73_M2}
GLUCOSE BLD-MCNC: 155 MG/DL (ref 70–99)
HCT VFR BLD CALC: 35.3 % (ref 40.7–50.3)
HEMOGLOBIN: 11.8 G/DL (ref 13–17)
IMMATURE GRANULOCYTES: 0 %
LYMPHOCYTES # BLD: 10 % (ref 24–44)
MCH RBC QN AUTO: 31.8 PG (ref 25.2–33.5)
MCHC RBC AUTO-ENTMCNC: 33.4 G/DL (ref 28.4–34.8)
MCV RBC AUTO: 95.1 FL (ref 82.6–102.9)
MONOCYTES # BLD: 5 % (ref 1–7)
MORPHOLOGY: NORMAL
NRBC AUTOMATED: 0 PER 100 WBC
PDW BLD-RTO: 12.6 % (ref 11.8–14.4)
PLATELET # BLD: 253 K/UL (ref 138–453)
PLATELET ESTIMATE: ABNORMAL
PMV BLD AUTO: 9.9 FL (ref 8.1–13.5)
POTASSIUM SERPL-SCNC: 4 MMOL/L (ref 3.7–5.3)
RBC # BLD: 3.71 M/UL (ref 4.21–5.77)
RBC # BLD: ABNORMAL 10*6/UL
SEG NEUTROPHILS: 85 % (ref 36–66)
SEGMENTED NEUTROPHILS ABSOLUTE COUNT: 10.96 K/UL (ref 1.8–7.7)
SODIUM BLD-SCNC: 134 MMOL/L (ref 135–144)
WBC # BLD: 12.9 K/UL (ref 3.5–11.3)
WBC # BLD: ABNORMAL 10*3/UL

## 2019-07-25 PROCEDURE — 2500000003 HC RX 250 WO HCPCS: Performed by: STUDENT IN AN ORGANIZED HEALTH CARE EDUCATION/TRAINING PROGRAM

## 2019-07-25 PROCEDURE — 6370000000 HC RX 637 (ALT 250 FOR IP): Performed by: STUDENT IN AN ORGANIZED HEALTH CARE EDUCATION/TRAINING PROGRAM

## 2019-07-25 PROCEDURE — 2580000003 HC RX 258: Performed by: STUDENT IN AN ORGANIZED HEALTH CARE EDUCATION/TRAINING PROGRAM

## 2019-07-25 PROCEDURE — 2000000000 HC ICU R&B

## 2019-07-25 PROCEDURE — 82330 ASSAY OF CALCIUM: CPT

## 2019-07-25 PROCEDURE — 74018 RADEX ABDOMEN 1 VIEW: CPT

## 2019-07-25 PROCEDURE — 94760 N-INVAS EAR/PLS OXIMETRY 1: CPT

## 2019-07-25 PROCEDURE — 80048 BASIC METABOLIC PNL TOTAL CA: CPT

## 2019-07-25 PROCEDURE — 85025 COMPLETE CBC W/AUTO DIFF WBC: CPT

## 2019-07-25 PROCEDURE — 6360000002 HC RX W HCPCS: Performed by: STUDENT IN AN ORGANIZED HEALTH CARE EDUCATION/TRAINING PROGRAM

## 2019-07-25 PROCEDURE — 36415 COLL VENOUS BLD VENIPUNCTURE: CPT

## 2019-07-25 PROCEDURE — 6370000000 HC RX 637 (ALT 250 FOR IP): Performed by: SURGERY

## 2019-07-25 RX ORDER — BISACODYL 10 MG
10 SUPPOSITORY, RECTAL RECTAL DAILY
Status: DISCONTINUED | OUTPATIENT
Start: 2019-07-25 | End: 2019-07-31 | Stop reason: HOSPADM

## 2019-07-25 RX ADMIN — FAMOTIDINE 20 MG: 10 INJECTION, SOLUTION INTRAVENOUS at 20:45

## 2019-07-25 RX ADMIN — SODIUM CHLORIDE, PRESERVATIVE FREE 10 ML: 5 INJECTION INTRAVENOUS at 10:12

## 2019-07-25 RX ADMIN — POTASSIUM CHLORIDE, DEXTROSE MONOHYDRATE AND SODIUM CHLORIDE: 150; 5; 450 INJECTION, SOLUTION INTRAVENOUS at 20:57

## 2019-07-25 RX ADMIN — FAMOTIDINE 20 MG: 10 INJECTION, SOLUTION INTRAVENOUS at 09:55

## 2019-07-25 RX ADMIN — SODIUM CHLORIDE, PRESERVATIVE FREE 10 ML: 5 INJECTION INTRAVENOUS at 20:58

## 2019-07-25 RX ADMIN — ASPIRIN 81 MG: 81 TABLET, CHEWABLE ORAL at 09:55

## 2019-07-25 RX ADMIN — BISACODYL 10 MG: 10 SUPPOSITORY RECTAL at 09:56

## 2019-07-25 RX ADMIN — PHENOL 1 SPRAY: 1.5 LIQUID ORAL at 21:07

## 2019-07-25 RX ADMIN — ENOXAPARIN SODIUM 40 MG: 40 INJECTION SUBCUTANEOUS at 09:55

## 2019-07-25 RX ADMIN — METOPROLOL TARTRATE 5 MG: 5 INJECTION, SOLUTION INTRAVENOUS at 06:15

## 2019-07-25 RX ADMIN — SODIUM CHLORIDE, PRESERVATIVE FREE 10 ML: 5 INJECTION INTRAVENOUS at 09:57

## 2019-07-25 RX ADMIN — ACETAMINOPHEN 1000 MG: 500 TABLET ORAL at 17:00

## 2019-07-25 RX ADMIN — ROSUVASTATIN CALCIUM 20 MG: 20 TABLET, FILM COATED ORAL at 20:45

## 2019-07-25 RX ADMIN — ONDANSETRON 4 MG: 2 INJECTION INTRAMUSCULAR; INTRAVENOUS at 06:15

## 2019-07-25 RX ADMIN — BISACODYL 10 MG: 10 SUPPOSITORY RECTAL at 01:13

## 2019-07-25 ASSESSMENT — PAIN SCALES - GENERAL
PAINLEVEL_OUTOF10: 3
PAINLEVEL_OUTOF10: 0
PAINLEVEL_OUTOF10: 4

## 2019-07-25 ASSESSMENT — PAIN DESCRIPTION - LOCATION: LOCATION: ABDOMEN

## 2019-07-25 ASSESSMENT — PAIN DESCRIPTION - ORIENTATION: ORIENTATION: MID

## 2019-07-25 ASSESSMENT — PAIN DESCRIPTION - DESCRIPTORS: DESCRIPTORS: ACHING

## 2019-07-25 ASSESSMENT — PAIN DESCRIPTION - FREQUENCY: FREQUENCY: CONTINUOUS

## 2019-07-25 ASSESSMENT — PAIN DESCRIPTION - PAIN TYPE: TYPE: SURGICAL PAIN

## 2019-07-25 ASSESSMENT — PAIN SCALES - WONG BAKER
WONGBAKER_NUMERICALRESPONSE: 0
WONGBAKER_NUMERICALRESPONSE: 0

## 2019-07-25 NOTE — PROGRESS NOTES
· Wound Type: Multiple(Incisions )  · Current Nutrition Therapies:  · Oral Diet Orders: NPO   · Anthropometric Measures:  · Ht: 5' 9\" (175.3 cm)   · Current Body Wt: 181 lb (82.1 kg)  · Admission Body Wt: 175 lb (79.4 kg)  · % Weight Change:  ,  No wt hx in EMR   · Ideal Body Wt: 160 lb (72.6 kg), % Ideal Body 109% (adm/ideal)  · BMI Classification: BMI 25.0 - 29.9 Overweight    Nutrition Interventions:   Continue NPO(Start nutrition support vs diet as able - recommend adding ensure enlive supplements TID as able )  Education Not Indicated, Continued Inpatient Monitoring    Nutrition Evaluation:   · Evaluation: Goals set   · Goals: Initiate diet vs nutrition support within 24-72 hrs     · Monitoring: Nutrition Progression, Skin Integrity, I&O, Weight, Pertinent Labs, Monitor Bowel Function      Electronically signed by Miky Anderson RD, LD on 7/25/19 at 10:05 AM    Contact Number: [phone]

## 2019-07-26 LAB
ABSOLUTE EOS #: 0.33 K/UL (ref 0–0.4)
ABSOLUTE IMMATURE GRANULOCYTE: 0 K/UL (ref 0–0.3)
ABSOLUTE LYMPH #: 0.87 K/UL (ref 1–4.8)
ABSOLUTE MONO #: 1.2 K/UL (ref 0.1–0.8)
ANION GAP SERPL CALCULATED.3IONS-SCNC: 12 MMOL/L (ref 9–17)
BASOPHILS # BLD: 0 % (ref 0–2)
BASOPHILS ABSOLUTE: 0 K/UL (ref 0–0.2)
BUN BLDV-MCNC: 6 MG/DL (ref 8–23)
BUN/CREAT BLD: ABNORMAL (ref 9–20)
CALCIUM SERPL-MCNC: 7.8 MG/DL (ref 8.6–10.4)
CHLORIDE BLD-SCNC: 99 MMOL/L (ref 98–107)
CO2: 24 MMOL/L (ref 20–31)
CREAT SERPL-MCNC: 0.72 MG/DL (ref 0.7–1.2)
DIFFERENTIAL TYPE: ABNORMAL
EOSINOPHILS RELATIVE PERCENT: 3 % (ref 1–4)
GFR AFRICAN AMERICAN: >60 ML/MIN
GFR NON-AFRICAN AMERICAN: >60 ML/MIN
GFR SERPL CREATININE-BSD FRML MDRD: ABNORMAL ML/MIN/{1.73_M2}
GFR SERPL CREATININE-BSD FRML MDRD: ABNORMAL ML/MIN/{1.73_M2}
GLUCOSE BLD-MCNC: 127 MG/DL (ref 70–99)
HCT VFR BLD CALC: 34.7 % (ref 40.7–50.3)
HEMOGLOBIN: 11.5 G/DL (ref 13–17)
IMMATURE GRANULOCYTES: 0 %
LYMPHOCYTES # BLD: 8 % (ref 24–44)
MCH RBC QN AUTO: 30.8 PG (ref 25.2–33.5)
MCHC RBC AUTO-ENTMCNC: 33.1 G/DL (ref 28.4–34.8)
MCV RBC AUTO: 93 FL (ref 82.6–102.9)
MONOCYTES # BLD: 11 % (ref 1–7)
MORPHOLOGY: NORMAL
NRBC AUTOMATED: 0 PER 100 WBC
PDW BLD-RTO: 12.5 % (ref 11.8–14.4)
PLATELET # BLD: 307 K/UL (ref 138–453)
PLATELET ESTIMATE: ABNORMAL
PMV BLD AUTO: 9.7 FL (ref 8.1–13.5)
POTASSIUM SERPL-SCNC: 3.2 MMOL/L (ref 3.7–5.3)
RBC # BLD: 3.73 M/UL (ref 4.21–5.77)
RBC # BLD: ABNORMAL 10*6/UL
SEG NEUTROPHILS: 78 % (ref 36–66)
SEGMENTED NEUTROPHILS ABSOLUTE COUNT: 8.5 K/UL (ref 1.8–7.7)
SODIUM BLD-SCNC: 135 MMOL/L (ref 135–144)
WBC # BLD: 10.9 K/UL (ref 3.5–11.3)
WBC # BLD: ABNORMAL 10*3/UL

## 2019-07-26 PROCEDURE — 6360000002 HC RX W HCPCS: Performed by: STUDENT IN AN ORGANIZED HEALTH CARE EDUCATION/TRAINING PROGRAM

## 2019-07-26 PROCEDURE — 6370000000 HC RX 637 (ALT 250 FOR IP): Performed by: STUDENT IN AN ORGANIZED HEALTH CARE EDUCATION/TRAINING PROGRAM

## 2019-07-26 PROCEDURE — 2500000003 HC RX 250 WO HCPCS: Performed by: STUDENT IN AN ORGANIZED HEALTH CARE EDUCATION/TRAINING PROGRAM

## 2019-07-26 PROCEDURE — 80048 BASIC METABOLIC PNL TOTAL CA: CPT

## 2019-07-26 PROCEDURE — 94760 N-INVAS EAR/PLS OXIMETRY 1: CPT

## 2019-07-26 PROCEDURE — 2580000003 HC RX 258: Performed by: STUDENT IN AN ORGANIZED HEALTH CARE EDUCATION/TRAINING PROGRAM

## 2019-07-26 PROCEDURE — 85025 COMPLETE CBC W/AUTO DIFF WBC: CPT

## 2019-07-26 PROCEDURE — 36415 COLL VENOUS BLD VENIPUNCTURE: CPT

## 2019-07-26 PROCEDURE — 97535 SELF CARE MNGMENT TRAINING: CPT

## 2019-07-26 PROCEDURE — 2060000000 HC ICU INTERMEDIATE R&B

## 2019-07-26 RX ORDER — POTASSIUM CHLORIDE 20 MEQ/1
40 TABLET, EXTENDED RELEASE ORAL PRN
Status: DISCONTINUED | OUTPATIENT
Start: 2019-07-26 | End: 2019-07-31 | Stop reason: HOSPADM

## 2019-07-26 RX ORDER — 0.9 % SODIUM CHLORIDE 0.9 %
VIAL (ML) INJECTION
Status: DISPENSED
Start: 2019-07-26 | End: 2019-07-27

## 2019-07-26 RX ORDER — POTASSIUM CHLORIDE 7.45 MG/ML
10 INJECTION INTRAVENOUS PRN
Status: DISCONTINUED | OUTPATIENT
Start: 2019-07-26 | End: 2019-07-31 | Stop reason: HOSPADM

## 2019-07-26 RX ADMIN — POTASSIUM BICARBONATE 40 MEQ: 782 TABLET, EFFERVESCENT ORAL at 09:54

## 2019-07-26 RX ADMIN — SODIUM CHLORIDE, PRESERVATIVE FREE 10 ML: 5 INJECTION INTRAVENOUS at 09:55

## 2019-07-26 RX ADMIN — FAMOTIDINE 20 MG: 10 INJECTION, SOLUTION INTRAVENOUS at 21:48

## 2019-07-26 RX ADMIN — ENOXAPARIN SODIUM 40 MG: 40 INJECTION SUBCUTANEOUS at 09:53

## 2019-07-26 RX ADMIN — ASPIRIN 81 MG: 81 TABLET, CHEWABLE ORAL at 09:53

## 2019-07-26 RX ADMIN — BISACODYL 10 MG: 10 SUPPOSITORY RECTAL at 09:53

## 2019-07-26 RX ADMIN — ACETAMINOPHEN 1000 MG: 500 TABLET ORAL at 05:25

## 2019-07-26 RX ADMIN — POTASSIUM CHLORIDE, DEXTROSE MONOHYDRATE AND SODIUM CHLORIDE: 150; 5; 450 INJECTION, SOLUTION INTRAVENOUS at 05:26

## 2019-07-26 RX ADMIN — ROSUVASTATIN CALCIUM 20 MG: 20 TABLET, FILM COATED ORAL at 21:49

## 2019-07-26 RX ADMIN — Medication 100 MG: at 21:48

## 2019-07-26 RX ADMIN — Medication 100 MG: at 10:38

## 2019-07-26 RX ADMIN — FENTANYL CITRATE 50 MCG: 50 INJECTION INTRAMUSCULAR; INTRAVENOUS at 14:05

## 2019-07-26 RX ADMIN — POTASSIUM CHLORIDE, DEXTROSE MONOHYDRATE AND SODIUM CHLORIDE: 150; 5; 450 INJECTION, SOLUTION INTRAVENOUS at 17:36

## 2019-07-26 RX ADMIN — PROMETHAZINE HYDROCHLORIDE 12.5 MG: 25 INJECTION INTRAMUSCULAR; INTRAVENOUS at 22:07

## 2019-07-26 RX ADMIN — ACETAMINOPHEN 1000 MG: 500 TABLET ORAL at 21:48

## 2019-07-26 RX ADMIN — FAMOTIDINE 20 MG: 10 INJECTION, SOLUTION INTRAVENOUS at 08:54

## 2019-07-26 RX ADMIN — FENTANYL CITRATE 50 MCG: 50 INJECTION INTRAMUSCULAR; INTRAVENOUS at 00:34

## 2019-07-26 RX ADMIN — POTASSIUM CHLORIDE, DEXTROSE MONOHYDRATE AND SODIUM CHLORIDE: 150; 5; 450 INJECTION, SOLUTION INTRAVENOUS at 21:48

## 2019-07-26 RX ADMIN — ONDANSETRON 4 MG: 2 INJECTION INTRAMUSCULAR; INTRAVENOUS at 08:53

## 2019-07-26 ASSESSMENT — PAIN SCALES - GENERAL
PAINLEVEL_OUTOF10: 4
PAINLEVEL_OUTOF10: 2
PAINLEVEL_OUTOF10: 7
PAINLEVEL_OUTOF10: 6
PAINLEVEL_OUTOF10: 3
PAINLEVEL_OUTOF10: 7

## 2019-07-26 ASSESSMENT — PAIN DESCRIPTION - ORIENTATION
ORIENTATION: RIGHT;LEFT;MID
ORIENTATION: MID

## 2019-07-26 ASSESSMENT — PAIN DESCRIPTION - PAIN TYPE
TYPE: SURGICAL PAIN

## 2019-07-26 ASSESSMENT — PAIN SCALES - WONG BAKER
WONGBAKER_NUMERICALRESPONSE: 0
WONGBAKER_NUMERICALRESPONSE: 0

## 2019-07-26 ASSESSMENT — PAIN DESCRIPTION - LOCATION
LOCATION: ABDOMEN
LOCATION: ABDOMEN;GROIN

## 2019-07-26 NOTE — PROGRESS NOTES
07/26/2019    CREATININE 0.72 07/26/2019    CALCIUM 7.8 07/26/2019    GFRAA >60 07/26/2019    LABGLOM >60 07/26/2019    GLUCOSE 127 07/26/2019         ASSESSMENT   3 54-year-old male postop day 6 status post open aortobifemoral bypass, persistent ileus, NG tube in place. PLAN  1. Patient seen and examined at bedside this morning, overall patient feels better, NG tube to low intermittent wall suction  2. Neuro-pain control, oral Patricia when tube off suction, IV fentanyl q 2 hrs prn, continue scheduled Tylenol  3. Cardiac-regular rate and rhythm, history of hypertension, continue baby aspirin, LISBETH with no signs of intracardiac thrombus, appreciate cardiology input  4. Pulmonary-clear to auscultation, saturations fine on room air, encourage incentive spirometer and pt to be oob to ambulate, up to chair   5. Renal-UOP- good UOP overnight, D5 1/2 NS 20 meq K at 110 mL/hr, Guillen out 7/21, will replace potassium this morning, K-3.2, will keep K greater than 4.0 to help improve ileus  6. GI-NG tube to low intermittent wall suction, 600 mL of bilious fluid out overnight, will continue to low intermittent wall suction at this time, n.p.o., IV fluids running, serial abd exams, KUB reviewed, concern for mild ileus, NG tube in place. We will continue suppositories, will give enema this morning. 7. MSK- Patient to be out of bed to ambulate today, up to chair, can clamp NG tube for ambulation. 8. Continue Lovenox, ASA 81 mg  9. Continue q. 4 hr neurovascular checks, patient currently has palpable bilateral DP/PT pulses. 10. PT/OT eval and treat   11.  Hemoglobin stable, WBC -10, decreasing daily- likely due to post op, will continue to monitor at this time        Electronically signed by Arelis Parra DO  on 7/26/2019 at 7:46 AM

## 2019-07-26 NOTE — PROGRESS NOTES
Caregiver Present: Yes(wife & son)  Pain Assessment  Pain Assessment: 0-10  Pain Level: 7  Pain Type: Surgical pain  Pain Location: Abdomen;Groin  Pain Orientation: Right;Left;Mid  Response to Pain Intervention: Patient Satisfied  Objective    ADL  Feeding: NPO(NG d/t Ileus)  Grooming: Stand by assistance;Setup; Increased time to complete(Seated at tray table-shaving, oral care, washing hair and face)  Toileting: Stand by assistance(Using urinal)    Additional Comments: Pt up in chair upon arrival. Setup at tray table for grooming with increased time needed d/t fatigue and pain from recent NG placement d/t ileus (see above for LOF). Pt declined completing full bathing at this time d/t pain/fatigue. Pt on RA throughout tx. Pt remained up in recliner, call light and phone in reach. Wife and son present throughout tx. RN notified.      Balance  Sitting Balance: Supervision      Plan   Plan  Times per week: 3-5x/wk   Current Treatment Recommendations: Strengthening, Endurance Training, Patient/Caregiver Education & Training, Self-Care / ADL, Safety Education & Training, Functional Mobility Training, Home Management Training    Goals  Short term goals  Time Frame for Short term goals: Patient will, by discharge  Short term goal 1: demonstrate LB self-cares with AE PRN at Mod I   Short term goal 2: demonstrate UB self-cares at Mod I with compensatory strategies PRN   Short term goal 3: demonstrate ~25 minutes of functional activity tolerance to engage in self-care tasks   Short term goal 4: demonstrate functional transfers/mobility at Supervision with good safety awareness to complete self-cares  Short term goal 5: demonstrate ~15 minutes of dynamic standing tolerance to engage in self-cares/IADL tasks at SBA      Therapy Time   Individual Concurrent Group Co-treatment   Time In 1430         Time Out Kaci 67, PA/L

## 2019-07-27 ENCOUNTER — APPOINTMENT (OUTPATIENT)
Dept: GENERAL RADIOLOGY | Age: 59
DRG: 271 | End: 2019-07-27
Payer: COMMERCIAL

## 2019-07-27 PROBLEM — D62 ACUTE BLOOD LOSS ANEMIA: Status: ACTIVE | Noted: 2019-07-27

## 2019-07-27 PROBLEM — K56.7 ILEUS (HCC): Status: ACTIVE | Noted: 2019-07-27

## 2019-07-27 LAB
ABSOLUTE EOS #: 0.2 K/UL (ref 0–0.4)
ABSOLUTE IMMATURE GRANULOCYTE: 0.1 K/UL (ref 0–0.3)
ABSOLUTE LYMPH #: 1 K/UL (ref 1–4.8)
ABSOLUTE MONO #: 1.5 K/UL (ref 0.1–0.8)
ANION GAP SERPL CALCULATED.3IONS-SCNC: 11 MMOL/L (ref 9–17)
BASOPHILS # BLD: 0 % (ref 0–2)
BASOPHILS ABSOLUTE: 0 K/UL (ref 0–0.2)
BUN BLDV-MCNC: 6 MG/DL (ref 8–23)
BUN/CREAT BLD: ABNORMAL (ref 9–20)
CALCIUM SERPL-MCNC: 7.9 MG/DL (ref 8.6–10.4)
CHLORIDE BLD-SCNC: 99 MMOL/L (ref 98–107)
CO2: 25 MMOL/L (ref 20–31)
CREAT SERPL-MCNC: 0.78 MG/DL (ref 0.7–1.2)
DIFFERENTIAL TYPE: ABNORMAL
EOSINOPHILS RELATIVE PERCENT: 2 % (ref 1–4)
GFR AFRICAN AMERICAN: >60 ML/MIN
GFR NON-AFRICAN AMERICAN: >60 ML/MIN
GFR SERPL CREATININE-BSD FRML MDRD: ABNORMAL ML/MIN/{1.73_M2}
GFR SERPL CREATININE-BSD FRML MDRD: ABNORMAL ML/MIN/{1.73_M2}
GLUCOSE BLD-MCNC: 107 MG/DL (ref 70–99)
HCT VFR BLD CALC: 32.2 % (ref 40.7–50.3)
HEMOGLOBIN: 10.6 G/DL (ref 13–17)
IMMATURE GRANULOCYTES: 1 %
LYMPHOCYTES # BLD: 10 % (ref 24–44)
MAGNESIUM: 1.8 MG/DL (ref 1.6–2.6)
MCH RBC QN AUTO: 30.8 PG (ref 25.2–33.5)
MCHC RBC AUTO-ENTMCNC: 32.9 G/DL (ref 28.4–34.8)
MCV RBC AUTO: 93.6 FL (ref 82.6–102.9)
MONOCYTES # BLD: 15 % (ref 1–7)
MORPHOLOGY: ABNORMAL
NRBC AUTOMATED: 0 PER 100 WBC
PDW BLD-RTO: 12.6 % (ref 11.8–14.4)
PHOSPHORUS: 1.8 MG/DL (ref 2.5–4.5)
PLATELET # BLD: 343 K/UL (ref 138–453)
PLATELET ESTIMATE: ABNORMAL
PMV BLD AUTO: 9.6 FL (ref 8.1–13.5)
POTASSIUM SERPL-SCNC: 3.7 MMOL/L (ref 3.7–5.3)
RBC # BLD: 3.44 M/UL (ref 4.21–5.77)
RBC # BLD: ABNORMAL 10*6/UL
SEG NEUTROPHILS: 72 % (ref 36–66)
SEGMENTED NEUTROPHILS ABSOLUTE COUNT: 7.2 K/UL (ref 1.8–7.7)
SODIUM BLD-SCNC: 135 MMOL/L (ref 135–144)
WBC # BLD: 10 K/UL (ref 3.5–11.3)
WBC # BLD: ABNORMAL 10*3/UL

## 2019-07-27 PROCEDURE — 84100 ASSAY OF PHOSPHORUS: CPT

## 2019-07-27 PROCEDURE — 6360000002 HC RX W HCPCS: Performed by: STUDENT IN AN ORGANIZED HEALTH CARE EDUCATION/TRAINING PROGRAM

## 2019-07-27 PROCEDURE — 80048 BASIC METABOLIC PNL TOTAL CA: CPT

## 2019-07-27 PROCEDURE — 6370000000 HC RX 637 (ALT 250 FOR IP): Performed by: STUDENT IN AN ORGANIZED HEALTH CARE EDUCATION/TRAINING PROGRAM

## 2019-07-27 PROCEDURE — 74018 RADEX ABDOMEN 1 VIEW: CPT

## 2019-07-27 PROCEDURE — 2500000003 HC RX 250 WO HCPCS: Performed by: STUDENT IN AN ORGANIZED HEALTH CARE EDUCATION/TRAINING PROGRAM

## 2019-07-27 PROCEDURE — 85025 COMPLETE CBC W/AUTO DIFF WBC: CPT

## 2019-07-27 PROCEDURE — 2580000003 HC RX 258: Performed by: STUDENT IN AN ORGANIZED HEALTH CARE EDUCATION/TRAINING PROGRAM

## 2019-07-27 PROCEDURE — 2060000000 HC ICU INTERMEDIATE R&B

## 2019-07-27 PROCEDURE — 36415 COLL VENOUS BLD VENIPUNCTURE: CPT

## 2019-07-27 PROCEDURE — 83735 ASSAY OF MAGNESIUM: CPT

## 2019-07-27 RX ORDER — OXYCODONE HYDROCHLORIDE 5 MG/1
5 TABLET ORAL EVERY 6 HOURS PRN
Status: DISCONTINUED | OUTPATIENT
Start: 2019-07-27 | End: 2019-07-31 | Stop reason: HOSPADM

## 2019-07-27 RX ADMIN — ACETAMINOPHEN 1000 MG: 500 TABLET ORAL at 20:34

## 2019-07-27 RX ADMIN — SODIUM CHLORIDE, PRESERVATIVE FREE 10 ML: 5 INJECTION INTRAVENOUS at 08:31

## 2019-07-27 RX ADMIN — POTASSIUM PHOSPHATE, MONOBASIC AND POTASSIUM PHOSPHATE, DIBASIC 20 MMOL: 224; 236 INJECTION, SOLUTION, CONCENTRATE INTRAVENOUS at 14:04

## 2019-07-27 RX ADMIN — ACETAMINOPHEN 1000 MG: 500 TABLET ORAL at 04:50

## 2019-07-27 RX ADMIN — FAMOTIDINE 20 MG: 10 INJECTION, SOLUTION INTRAVENOUS at 08:29

## 2019-07-27 RX ADMIN — Medication 100 MG: at 08:31

## 2019-07-27 RX ADMIN — SODIUM CHLORIDE, PRESERVATIVE FREE 10 ML: 5 INJECTION INTRAVENOUS at 21:00

## 2019-07-27 RX ADMIN — ENOXAPARIN SODIUM 40 MG: 40 INJECTION SUBCUTANEOUS at 08:31

## 2019-07-27 RX ADMIN — ONDANSETRON 4 MG: 2 INJECTION INTRAMUSCULAR; INTRAVENOUS at 08:28

## 2019-07-27 RX ADMIN — ASPIRIN 81 MG: 81 TABLET, CHEWABLE ORAL at 09:53

## 2019-07-27 RX ADMIN — OXYCODONE HYDROCHLORIDE 5 MG: 5 TABLET ORAL at 13:23

## 2019-07-27 RX ADMIN — ROSUVASTATIN CALCIUM 20 MG: 20 TABLET, FILM COATED ORAL at 22:24

## 2019-07-27 RX ADMIN — FAMOTIDINE 20 MG: 10 INJECTION, SOLUTION INTRAVENOUS at 20:35

## 2019-07-27 RX ADMIN — BISACODYL 10 MG: 10 SUPPOSITORY RECTAL at 08:32

## 2019-07-27 RX ADMIN — Medication 100 MG: at 20:34

## 2019-07-27 ASSESSMENT — PAIN SCALES - GENERAL
PAINLEVEL_OUTOF10: 0
PAINLEVEL_OUTOF10: 7
PAINLEVEL_OUTOF10: 0

## 2019-07-28 ENCOUNTER — APPOINTMENT (OUTPATIENT)
Dept: GENERAL RADIOLOGY | Age: 59
DRG: 271 | End: 2019-07-28
Payer: COMMERCIAL

## 2019-07-28 LAB
ANION GAP SERPL CALCULATED.3IONS-SCNC: 13 MMOL/L (ref 9–17)
BUN BLDV-MCNC: 5 MG/DL (ref 8–23)
BUN/CREAT BLD: ABNORMAL (ref 9–20)
CALCIUM SERPL-MCNC: 8.3 MG/DL (ref 8.6–10.4)
CHLORIDE BLD-SCNC: 95 MMOL/L (ref 98–107)
CO2: 25 MMOL/L (ref 20–31)
CREAT SERPL-MCNC: 0.73 MG/DL (ref 0.7–1.2)
GFR AFRICAN AMERICAN: >60 ML/MIN
GFR NON-AFRICAN AMERICAN: >60 ML/MIN
GFR SERPL CREATININE-BSD FRML MDRD: ABNORMAL ML/MIN/{1.73_M2}
GFR SERPL CREATININE-BSD FRML MDRD: ABNORMAL ML/MIN/{1.73_M2}
GLUCOSE BLD-MCNC: 114 MG/DL (ref 70–99)
MAGNESIUM: 1.8 MG/DL (ref 1.6–2.6)
PHOSPHORUS: 2.8 MG/DL (ref 2.5–4.5)
POTASSIUM SERPL-SCNC: 3.8 MMOL/L (ref 3.7–5.3)
SODIUM BLD-SCNC: 133 MMOL/L (ref 135–144)

## 2019-07-28 PROCEDURE — 6370000000 HC RX 637 (ALT 250 FOR IP): Performed by: STUDENT IN AN ORGANIZED HEALTH CARE EDUCATION/TRAINING PROGRAM

## 2019-07-28 PROCEDURE — 2500000003 HC RX 250 WO HCPCS: Performed by: STUDENT IN AN ORGANIZED HEALTH CARE EDUCATION/TRAINING PROGRAM

## 2019-07-28 PROCEDURE — 2060000000 HC ICU INTERMEDIATE R&B

## 2019-07-28 PROCEDURE — 83735 ASSAY OF MAGNESIUM: CPT

## 2019-07-28 PROCEDURE — 84100 ASSAY OF PHOSPHORUS: CPT

## 2019-07-28 PROCEDURE — 36415 COLL VENOUS BLD VENIPUNCTURE: CPT

## 2019-07-28 PROCEDURE — 74018 RADEX ABDOMEN 1 VIEW: CPT

## 2019-07-28 PROCEDURE — 80048 BASIC METABOLIC PNL TOTAL CA: CPT

## 2019-07-28 PROCEDURE — 6360000002 HC RX W HCPCS: Performed by: STUDENT IN AN ORGANIZED HEALTH CARE EDUCATION/TRAINING PROGRAM

## 2019-07-28 RX ADMIN — ONDANSETRON 4 MG: 2 INJECTION INTRAMUSCULAR; INTRAVENOUS at 20:09

## 2019-07-28 RX ADMIN — BISACODYL 10 MG: 10 SUPPOSITORY RECTAL at 08:34

## 2019-07-28 RX ADMIN — ACETAMINOPHEN 1000 MG: 500 TABLET ORAL at 08:35

## 2019-07-28 RX ADMIN — ACETAMINOPHEN 1000 MG: 500 TABLET ORAL at 13:43

## 2019-07-28 RX ADMIN — ENOXAPARIN SODIUM 40 MG: 40 INJECTION SUBCUTANEOUS at 08:36

## 2019-07-28 RX ADMIN — METOPROLOL TARTRATE 5 MG: 5 INJECTION, SOLUTION INTRAVENOUS at 05:47

## 2019-07-28 RX ADMIN — Medication 100 MG: at 08:35

## 2019-07-28 RX ADMIN — POTASSIUM CHLORIDE, DEXTROSE MONOHYDRATE AND SODIUM CHLORIDE: 150; 5; 450 INJECTION, SOLUTION INTRAVENOUS at 02:10

## 2019-07-28 RX ADMIN — FAMOTIDINE 20 MG: 10 INJECTION, SOLUTION INTRAVENOUS at 08:37

## 2019-07-28 RX ADMIN — ASPIRIN 81 MG: 81 TABLET, CHEWABLE ORAL at 08:36

## 2019-07-28 RX ADMIN — FAMOTIDINE 20 MG: 10 INJECTION, SOLUTION INTRAVENOUS at 20:58

## 2019-07-28 RX ADMIN — ONDANSETRON 4 MG: 2 INJECTION INTRAMUSCULAR; INTRAVENOUS at 05:48

## 2019-07-28 ASSESSMENT — PAIN SCALES - GENERAL
PAINLEVEL_OUTOF10: 2
PAINLEVEL_OUTOF10: 3
PAINLEVEL_OUTOF10: 0

## 2019-07-28 NOTE — PROGRESS NOTES
Vascular Surgery Progress Note         PATIENT NAME: Princess Burno     TODAY'S DATE: 7/28/2019, 9:34 AM    SUBJECTIVE:    Pt seen and examined at bedside this morning. Continues to be nauseous. NGT with 1 L bilious fluid. Ambulating the hallway multiple times. + stool -- liquid in consistency. No flatus. Abdominal distension. OBJECTIVE:   Vitals:  /68   Pulse 76   Temp 99.5 °F (37.5 °C) (Oral)   Resp 16   Ht 5' 9\" (1.753 m)   Wt 183 lb 10.3 oz (83.3 kg)   SpO2 99%   BMI 27.12 kg/m²      INTAKE/OUTPUT:      Intake/Output Summary (Last 24 hours) at 7/28/2019 0934  Last data filed at 7/28/2019 0604  Gross per 24 hour   Intake 1371.26 ml   Output 2225 ml   Net -853.74 ml                 GENERAL: AOx3, NAD  HEENT: EOMI bilaterally. NGT in place with bilious output. CARDIAC: Regular rate and rhythm. ABDOMEN: Soft, non tender to palpation. + distended, tympanic. Midline incision clean, dry and intact. EXTREMITY: moves all extremities, no edema. Pulses palpable bilateral radial, femoral, DP/PT. Groin incisions clean, dry and intact. NEURO: CN II-XII intact. Gross motor intact.       Data:  CBC with Differential:    Lab Results   Component Value Date    WBC 10.0 07/27/2019    RBC 3.44 07/27/2019    HGB 10.6 07/27/2019    HCT 32.2 07/27/2019     07/27/2019    MCV 93.6 07/27/2019    MCH 30.8 07/27/2019    MCHC 32.9 07/27/2019    RDW 12.6 07/27/2019    LYMPHOPCT 10 07/27/2019    MONOPCT 15 07/27/2019    BASOPCT 0 07/27/2019    MONOSABS 1.50 07/27/2019    LYMPHSABS 1.00 07/27/2019    EOSABS 0.20 07/27/2019    BASOSABS 0.00 07/27/2019    DIFFTYPE NOT REPORTED 07/27/2019     BMP:    Lab Results   Component Value Date     07/27/2019    K 3.7 07/27/2019    CL 99 07/27/2019    CO2 25 07/27/2019    BUN 6 07/27/2019    CREATININE 0.78 07/27/2019    CALCIUM 7.9 07/27/2019    GFRAA >60 07/27/2019    LABGLOM >60 07/27/2019    GLUCOSE 107 07/27/2019         ASSESSMENT   3 60year-old male postop day 7

## 2019-07-29 ENCOUNTER — APPOINTMENT (OUTPATIENT)
Dept: CT IMAGING | Age: 59
DRG: 271 | End: 2019-07-29
Payer: COMMERCIAL

## 2019-07-29 PROBLEM — E44.0 MODERATE MALNUTRITION (HCC): Status: ACTIVE | Noted: 2019-07-29

## 2019-07-29 LAB
ABSOLUTE EOS #: 0.35 K/UL (ref 0–0.44)
ABSOLUTE IMMATURE GRANULOCYTE: 0.18 K/UL (ref 0–0.3)
ABSOLUTE LYMPH #: 1.77 K/UL (ref 1.1–3.7)
ABSOLUTE MONO #: 2.66 K/UL (ref 0.1–1.2)
ANION GAP SERPL CALCULATED.3IONS-SCNC: 9 MMOL/L (ref 9–17)
BASOPHILS # BLD: 0 % (ref 0–2)
BASOPHILS ABSOLUTE: 0 K/UL (ref 0–0.2)
BUN BLDV-MCNC: 6 MG/DL (ref 8–23)
BUN/CREAT BLD: ABNORMAL (ref 9–20)
CALCIUM SERPL-MCNC: 8 MG/DL (ref 8.6–10.4)
CHLORIDE BLD-SCNC: 99 MMOL/L (ref 98–107)
CO2: 25 MMOL/L (ref 20–31)
CREAT SERPL-MCNC: 0.89 MG/DL (ref 0.7–1.2)
DIFFERENTIAL TYPE: ABNORMAL
EOSINOPHILS RELATIVE PERCENT: 2 % (ref 1–4)
GFR AFRICAN AMERICAN: >60 ML/MIN
GFR NON-AFRICAN AMERICAN: >60 ML/MIN
GFR SERPL CREATININE-BSD FRML MDRD: ABNORMAL ML/MIN/{1.73_M2}
GFR SERPL CREATININE-BSD FRML MDRD: ABNORMAL ML/MIN/{1.73_M2}
GLUCOSE BLD-MCNC: 111 MG/DL (ref 70–99)
HCT VFR BLD CALC: 32.4 % (ref 40.7–50.3)
HEMOGLOBIN: 10.3 G/DL (ref 13–17)
IMMATURE GRANULOCYTES: 1 %
LYMPHOCYTES # BLD: 10 % (ref 24–43)
MCH RBC QN AUTO: 30.2 PG (ref 25.2–33.5)
MCHC RBC AUTO-ENTMCNC: 31.8 G/DL (ref 28.4–34.8)
MCV RBC AUTO: 95 FL (ref 82.6–102.9)
MONOCYTES # BLD: 15 % (ref 3–12)
MORPHOLOGY: ABNORMAL
NRBC AUTOMATED: 0 PER 100 WBC
PDW BLD-RTO: 12.7 % (ref 11.8–14.4)
PLATELET # BLD: 419 K/UL (ref 138–453)
PLATELET ESTIMATE: ABNORMAL
PMV BLD AUTO: 9.1 FL (ref 8.1–13.5)
POTASSIUM SERPL-SCNC: 4.7 MMOL/L (ref 3.7–5.3)
RBC # BLD: 3.41 M/UL (ref 4.21–5.77)
RBC # BLD: ABNORMAL 10*6/UL
SEG NEUTROPHILS: 72 % (ref 36–65)
SEGMENTED NEUTROPHILS ABSOLUTE COUNT: 12.74 K/UL (ref 1.5–8.1)
SODIUM BLD-SCNC: 133 MMOL/L (ref 135–144)
WBC # BLD: 17.7 K/UL (ref 3.5–11.3)
WBC # BLD: ABNORMAL 10*3/UL

## 2019-07-29 PROCEDURE — 6370000000 HC RX 637 (ALT 250 FOR IP): Performed by: STUDENT IN AN ORGANIZED HEALTH CARE EDUCATION/TRAINING PROGRAM

## 2019-07-29 PROCEDURE — 6360000002 HC RX W HCPCS: Performed by: STUDENT IN AN ORGANIZED HEALTH CARE EDUCATION/TRAINING PROGRAM

## 2019-07-29 PROCEDURE — 2060000000 HC ICU INTERMEDIATE R&B

## 2019-07-29 PROCEDURE — 6360000004 HC RX CONTRAST MEDICATION: Performed by: STUDENT IN AN ORGANIZED HEALTH CARE EDUCATION/TRAINING PROGRAM

## 2019-07-29 PROCEDURE — 74177 CT ABD & PELVIS W/CONTRAST: CPT

## 2019-07-29 PROCEDURE — 2500000003 HC RX 250 WO HCPCS: Performed by: STUDENT IN AN ORGANIZED HEALTH CARE EDUCATION/TRAINING PROGRAM

## 2019-07-29 PROCEDURE — 36415 COLL VENOUS BLD VENIPUNCTURE: CPT

## 2019-07-29 PROCEDURE — 2580000003 HC RX 258: Performed by: STUDENT IN AN ORGANIZED HEALTH CARE EDUCATION/TRAINING PROGRAM

## 2019-07-29 PROCEDURE — 80048 BASIC METABOLIC PNL TOTAL CA: CPT

## 2019-07-29 PROCEDURE — 6370000000 HC RX 637 (ALT 250 FOR IP)

## 2019-07-29 PROCEDURE — 85025 COMPLETE CBC W/AUTO DIFF WBC: CPT

## 2019-07-29 RX ORDER — ACETAMINOPHEN 500 MG
TABLET ORAL
Status: COMPLETED
Start: 2019-07-29 | End: 2019-07-29

## 2019-07-29 RX ADMIN — ACETAMINOPHEN 1000 MG: 500 TABLET ORAL at 03:33

## 2019-07-29 RX ADMIN — SODIUM CHLORIDE, PRESERVATIVE FREE 10 ML: 5 INJECTION INTRAVENOUS at 20:18

## 2019-07-29 RX ADMIN — ACETAMINOPHEN 1000 MG: 500 TABLET ORAL at 20:39

## 2019-07-29 RX ADMIN — OXYCODONE HYDROCHLORIDE 5 MG: 5 TABLET ORAL at 03:29

## 2019-07-29 RX ADMIN — ACETAMINOPHEN 1000 MG: 500 TABLET ORAL at 13:30

## 2019-07-29 RX ADMIN — ENOXAPARIN SODIUM 40 MG: 40 INJECTION SUBCUTANEOUS at 09:18

## 2019-07-29 RX ADMIN — ONDANSETRON 4 MG: 2 INJECTION INTRAMUSCULAR; INTRAVENOUS at 03:26

## 2019-07-29 RX ADMIN — IOHEXOL 75 ML: 350 INJECTION, SOLUTION INTRAVENOUS at 14:31

## 2019-07-29 RX ADMIN — IOHEXOL 50 ML: 240 INJECTION, SOLUTION INTRATHECAL; INTRAVASCULAR; INTRAVENOUS; ORAL at 12:42

## 2019-07-29 RX ADMIN — Medication 100 MG: at 20:19

## 2019-07-29 RX ADMIN — SODIUM CHLORIDE, PRESERVATIVE FREE 10 ML: 5 INJECTION INTRAVENOUS at 09:18

## 2019-07-29 RX ADMIN — BISACODYL 10 MG: 10 SUPPOSITORY RECTAL at 09:17

## 2019-07-29 RX ADMIN — OXYCODONE HYDROCHLORIDE 5 MG: 5 TABLET ORAL at 20:17

## 2019-07-29 RX ADMIN — FAMOTIDINE 20 MG: 10 INJECTION, SOLUTION INTRAVENOUS at 09:17

## 2019-07-29 RX ADMIN — OXYCODONE HYDROCHLORIDE 5 MG: 5 TABLET ORAL at 09:26

## 2019-07-29 RX ADMIN — FAMOTIDINE 20 MG: 10 INJECTION, SOLUTION INTRAVENOUS at 20:20

## 2019-07-29 RX ADMIN — ROSUVASTATIN CALCIUM 20 MG: 20 TABLET, FILM COATED ORAL at 20:20

## 2019-07-29 RX ADMIN — POTASSIUM CHLORIDE, DEXTROSE MONOHYDRATE AND SODIUM CHLORIDE: 150; 5; 450 INJECTION, SOLUTION INTRAVENOUS at 07:04

## 2019-07-29 RX ADMIN — Medication 100 MG: at 09:18

## 2019-07-29 RX ADMIN — ASPIRIN 81 MG: 81 TABLET, CHEWABLE ORAL at 09:17

## 2019-07-29 ASSESSMENT — PAIN SCALES - GENERAL
PAINLEVEL_OUTOF10: 0
PAINLEVEL_OUTOF10: 2
PAINLEVEL_OUTOF10: 0
PAINLEVEL_OUTOF10: 8
PAINLEVEL_OUTOF10: 4
PAINLEVEL_OUTOF10: 7
PAINLEVEL_OUTOF10: 7
PAINLEVEL_OUTOF10: 6
PAINLEVEL_OUTOF10: 7
PAINLEVEL_OUTOF10: 7
PAINLEVEL_OUTOF10: 0
PAINLEVEL_OUTOF10: 2

## 2019-07-29 ASSESSMENT — PAIN DESCRIPTION - FREQUENCY
FREQUENCY: CONTINUOUS

## 2019-07-29 ASSESSMENT — PAIN DESCRIPTION - DESCRIPTORS
DESCRIPTORS: ACHING;CONSTANT
DESCRIPTORS: ACHING
DESCRIPTORS: ACHING;CONSTANT

## 2019-07-29 ASSESSMENT — PAIN DESCRIPTION - PAIN TYPE
TYPE: SURGICAL PAIN
TYPE: ACUTE PAIN
TYPE: SURGICAL PAIN

## 2019-07-29 ASSESSMENT — PAIN DESCRIPTION - LOCATION
LOCATION: GROIN;ABDOMEN
LOCATION: ABDOMEN

## 2019-07-29 ASSESSMENT — PAIN DESCRIPTION - PROGRESSION
CLINICAL_PROGRESSION: NOT CHANGED
CLINICAL_PROGRESSION: GRADUALLY WORSENING
CLINICAL_PROGRESSION: NOT CHANGED

## 2019-07-29 ASSESSMENT — PAIN - FUNCTIONAL ASSESSMENT
PAIN_FUNCTIONAL_ASSESSMENT: ACTIVITIES ARE NOT PREVENTED
PAIN_FUNCTIONAL_ASSESSMENT: ACTIVITIES ARE NOT PREVENTED

## 2019-07-29 ASSESSMENT — PAIN DESCRIPTION - ONSET
ONSET: ON-GOING
ONSET: ON-GOING
ONSET: AWAKENED FROM SLEEP

## 2019-07-29 ASSESSMENT — PAIN DESCRIPTION - ORIENTATION: ORIENTATION: LEFT;RIGHT

## 2019-07-29 NOTE — CONSULTS
PROGRESS NOTE      PATIENT NAME: Héctor RECORD NO. [id number]  DATE: 2019  SURGEON: Dr. Jared Lara: Franck Galicia MD    HD: # 9    ASSESSMENT    Patient Active Problem List   Diagnosis    Aortic occlusion ([address]    Ileus ([address]    Acute blood loss anemia    Moderate malnutrition ([address]       MEDICAL DECISION MAKING AND PLAN    Partial SBO  -CT findings: dilated small bowel loops with a transition point in the RLQ   -Continue NGT  -NPO  -Serial abdominal exams-currently distended, but nontender to palpation   -Monitor for return of bowel function   -KUB in the AM   -No plan for surgical intervention at this time, will continue to monitor       [address] is seen and examined. Patient is POD#9 s/p open aortobifemoral bypass. General surgery was consulted for concern for ileus vs. SBO. Patient denies abdominal pain, nausea, and vomiting. He states he had 2 small bowel movements today and a decent size bowel movement yesterday. NGT in place to wall suction with 175cc of bilious output today. OBJECTIVE  VITALS: Temp: Temp: 97.8 °F (36.6 °C)Temp  Av.6 °F (37 °C)  Min: 97.8 °F (36.6 °C)  Max: 99.6 °F (35.5 °C) BP Systolic (74YTC), FVJ:135 , Min:124 , JOSHUA:708   Diastolic (33ZFH), UUP:62, Min:69, Max:75   Pulse Pulse  Av.5  Min: 71  Max: 91 Resp Resp  Avg: 15.7  Min: 14  Max: 18 Pulse ox SpO2  Av.7 %  Min: 96 %  Max: 99 %  GENERAL: alert, no distress  NEURO: CNII-XII grossly intact, no focal neurological deficits    HEENT: atraumatic, normocephalic, sclera sclear, nose without deformity, trachea midline   LUNGS: clear to ausculation, without wheezes, rales or rhonci  HEART: normal rate and regular rhythm  ABDOMEN: soft, mildly distended, non-tender to palpation, no guarding or peritoneal signs present, midline incision CDI and with staples intact   EXTREMITY: no cyanosis, clubbing or edema    I/O last 3 completed shifts:   In:

## 2019-07-29 NOTE — PROGRESS NOTES
Nutrition Assessment    Type and Reason for Visit: Reassess    Nutrition Recommendations:   -Continue NPO status   -Start diet vs nutrition support as able   -If tube feeding is desired, recommend Osmolite 1.5 (Standard 1.5 House w/out fiber) @ goal rate of 55 mL/hr x 24 hrs ~>1980 kcals, 83 gms protein   -If PN is desired, recommend PPN 2 in 1 @ 42 mL/hr (1000 mLs) + 20% of 250 mL IV lipids + 200 gms dextrose + 50 gms AA ~>1380 kcals, 50 gms protein  -Will monitor nutrition progression    Nutrition Assessment: Pt declining from a nutritional standpoint aeb pt now meets the criteria for moderate malnutrition. Pt remains nutritionally compromised aeb NPO/CL diet x 9 days 2/2 ileus. Pt s/p open aortobifemoral bypass. NG output 600 mLs. If unable to advance diet, recommend starting nutrition support, will monitor. Malnutrition Assessment:  · Malnutrition Status: Meets the criteria for moderate malnutrition  · Context: Acute illness or injury  · Findings of the 6 clinical characteristics of malnutrition (Minimum of 2 out of 6 clinical characteristics is required to make the diagnosis of moderate or severe Protein Calorie Malnutrition based on AND/ASPEN Guidelines):  1. Energy Intake-Less than or equal to 75% of estimated energy requirement, Greater than or equal to 7 days    2. Weight Loss-No significant weight loss,    3. Fat Loss-No significant subcutaneous fat loss,    4. Muscle Loss-No significant muscle mass loss,    5. Fluid Accumulation-Mild fluid accumulation, Extremities  6.  Strength-Not measured    Nutrition Risk Level: High    Nutrient Needs:  · Estimated Daily Total Kcal: 1.2-1.3 ~>7376-0449 kcals/d   · Estimated Daily Protein (g): 1.2-1.5 gm/kg ~> gms/d     Nutrition Diagnosis:   · Problem:  Moderate malnutrition, In context of acute illness or injury  · Etiology: related to Insufficient energy/nutrient consumption, Alteration in GI function     Signs and symptoms:  as evidenced by Localized or generalized fluid accumulation(NPO/CL diet x 9 days )    Objective Information:  · Nutrition-Focused Physical Findings: NGT to LIWS  · Wound Type: Multiple(Incisions )  · Current Nutrition Therapies:  · Oral Diet Orders: NPO   · Anthropometric Measures:  · Ht: 5' 9\" (175.3 cm)   · Current Body Wt: 181 lb (82.1 kg)  · Admission Body Wt: 175 lb (79.4 kg)  · % Weight Change:  ,  No wt hx in EMR   · Ideal Body Wt: 160 lb (72.6 kg), % Ideal Body 109% (adm/ideal)  · BMI Classification: BMI 25.0 - 29.9 Overweight    Nutrition Interventions:   Continue NPO(Start nutrition support vs diet as able - recommend adding ensure enlive supplements TID as able )  Education Not Indicated, Continued Inpatient Monitoring    Nutrition Evaluation:   · Evaluation: No progress toward goals   · Goals: Initiate diet vs nutrition support within 24-72 hrs     · Monitoring: Nutrition Progression, Skin Integrity, I&O, Weight, Pertinent Labs, Monitor Bowel Function      Electronically signed by Papa Barton RD, LD on 7/29/19 at 11:06 AM    Contact Number: [phone]

## 2019-07-29 NOTE — PROGRESS NOTES
Vascular Surgery Progress Note         PATIENT NAME: Eddy Swann     TODAY'S DATE: 7/29/2019, 7:29 AM    SUBJECTIVE:    Pt seen and examined at bedside this morning. No acute events overnight. Afebrile. Patient had 2 small bowel movements, intermittent flatus. 600 mL bilious output from NG. Complains of mild nausea, no vomiting. Continues ambulating OOB without difficulty. Denies fever, chills, chest pain, SOB, abdominal pain. OBJECTIVE:   Vitals:  /69   Pulse 71   Temp 99.3 °F (37.4 °C) (Oral)   Resp 14   Ht 5' 9\" (1.753 m)   Wt 181 lb (82.1 kg)   SpO2 97%   BMI 26.73 kg/m²      INTAKE/OUTPUT:      Intake/Output Summary (Last 24 hours) at 7/29/2019 0729  Last data filed at 7/29/2019 0530  Gross per 24 hour   Intake 1313.11 ml   Output 1125 ml   Net 188.11 ml       NGT output: 600 bilious. GENERAL: AOx3, NAD  HEENT: EOMI bilaterally  CARDIAC: Regular rate and rhythm. ABDOMEN: Soft, NT, moderately distended, Active Bowel Sounds. Reducible Right inguinal hernia. EXTREMITY: Moves all extremities, no edema. 2+ Palpable pulses bilateral DP, PT.  NEURO: CN II-XII intact. Gross motor intact. INCISION: Midline abdominal incision with staples, dry, intact. No erythema, edema, purulent drainage, fluctuance, or crepitus. Bilateral inguinal incisions clean, dry, and intact.      Data:  CBC with Differential:    Lab Results   Component Value Date    WBC 17.7 07/29/2019    RBC 3.41 07/29/2019    HGB 10.3 07/29/2019    HCT 32.4 07/29/2019     07/29/2019    MCV 95.0 07/29/2019    MCH 30.2 07/29/2019    MCHC 31.8 07/29/2019    RDW 12.7 07/29/2019    LYMPHOPCT 10 07/29/2019    MONOPCT 15 07/29/2019    BASOPCT 0 07/29/2019    MONOSABS 2.66 07/29/2019    LYMPHSABS 1.77 07/29/2019    EOSABS 0.35 07/29/2019    BASOSABS 0.00 07/29/2019    DIFFTYPE NOT REPORTED 07/29/2019     CMP:    Lab Results   Component Value Date     07/29/2019    K 4.7 07/29/2019    CL 99 07/29/2019    CO2 25 07/29/2019    BUN 6 07/29/2019    CREATININE 0.89 07/29/2019    GFRAA >60 07/29/2019    LABGLOM >60 07/29/2019    GLUCOSE 111 07/29/2019    CALCIUM 8.0 07/29/2019         ASSESSMENT   60year old male POD #9 s/p open aortobifemoral bypass, persistent ileus, NGT in place    PLAN  1. Small BMs+, awaiting increase bowel function. Continue daily colace and dulcolax suppository. 2. NPO. 600 NGT output. Continue NGT to LIWS. 3. Encourage IS and ambulation in hallway. 4. Continue ASA 81.  5. Pain control: Tylenol, PRN Patricia  6. DVT PPx: Lovenox  7. GI ppx: Pepcid  8. Will discuss SBFT today. Will consult general surgery due to prolonged post operative ileus.      Electronically signed by Enio Grajeda DO on 7/29/2019 at 8:18 AM

## 2019-07-30 ENCOUNTER — APPOINTMENT (OUTPATIENT)
Dept: GENERAL RADIOLOGY | Age: 59
DRG: 271 | End: 2019-07-30
Payer: COMMERCIAL

## 2019-07-30 PROCEDURE — 2580000003 HC RX 258: Performed by: STUDENT IN AN ORGANIZED HEALTH CARE EDUCATION/TRAINING PROGRAM

## 2019-07-30 PROCEDURE — 2500000003 HC RX 250 WO HCPCS: Performed by: STUDENT IN AN ORGANIZED HEALTH CARE EDUCATION/TRAINING PROGRAM

## 2019-07-30 PROCEDURE — 6370000000 HC RX 637 (ALT 250 FOR IP): Performed by: STUDENT IN AN ORGANIZED HEALTH CARE EDUCATION/TRAINING PROGRAM

## 2019-07-30 PROCEDURE — 6360000002 HC RX W HCPCS: Performed by: STUDENT IN AN ORGANIZED HEALTH CARE EDUCATION/TRAINING PROGRAM

## 2019-07-30 PROCEDURE — 74018 RADEX ABDOMEN 1 VIEW: CPT

## 2019-07-30 PROCEDURE — 2060000000 HC ICU INTERMEDIATE R&B

## 2019-07-30 PROCEDURE — 97535 SELF CARE MNGMENT TRAINING: CPT

## 2019-07-30 RX ORDER — UREA 10 %
2 LOTION (ML) TOPICAL ONCE
Status: DISCONTINUED | OUTPATIENT
Start: 2019-07-30 | End: 2019-07-31 | Stop reason: HOSPADM

## 2019-07-30 RX ADMIN — ONDANSETRON 4 MG: 2 INJECTION INTRAMUSCULAR; INTRAVENOUS at 09:29

## 2019-07-30 RX ADMIN — SODIUM CHLORIDE, PRESERVATIVE FREE 10 ML: 5 INJECTION INTRAVENOUS at 20:00

## 2019-07-30 RX ADMIN — ASPIRIN 81 MG: 81 TABLET, CHEWABLE ORAL at 08:27

## 2019-07-30 RX ADMIN — ACETAMINOPHEN 1000 MG: 500 TABLET ORAL at 08:27

## 2019-07-30 RX ADMIN — ACETAMINOPHEN 1000 MG: 500 TABLET ORAL at 16:24

## 2019-07-30 RX ADMIN — POTASSIUM CHLORIDE, DEXTROSE MONOHYDRATE AND SODIUM CHLORIDE: 150; 5; 450 INJECTION, SOLUTION INTRAVENOUS at 03:16

## 2019-07-30 RX ADMIN — FAMOTIDINE 20 MG: 10 INJECTION, SOLUTION INTRAVENOUS at 08:27

## 2019-07-30 RX ADMIN — SODIUM CHLORIDE, PRESERVATIVE FREE 10 ML: 5 INJECTION INTRAVENOUS at 21:34

## 2019-07-30 RX ADMIN — Medication 100 MG: at 08:28

## 2019-07-30 RX ADMIN — ENOXAPARIN SODIUM 40 MG: 40 INJECTION SUBCUTANEOUS at 08:28

## 2019-07-30 RX ADMIN — OXYCODONE HYDROCHLORIDE 5 MG: 5 TABLET ORAL at 08:31

## 2019-07-30 RX ADMIN — FAMOTIDINE 20 MG: 10 INJECTION, SOLUTION INTRAVENOUS at 20:33

## 2019-07-30 RX ADMIN — ONDANSETRON 4 MG: 2 INJECTION INTRAMUSCULAR; INTRAVENOUS at 19:51

## 2019-07-30 RX ADMIN — BISACODYL 10 MG: 10 SUPPOSITORY RECTAL at 08:27

## 2019-07-30 ASSESSMENT — PAIN SCALES - GENERAL
PAINLEVEL_OUTOF10: 0
PAINLEVEL_OUTOF10: 0
PAINLEVEL_OUTOF10: 3
PAINLEVEL_OUTOF10: 0
PAINLEVEL_OUTOF10: 1

## 2019-07-30 ASSESSMENT — PAIN DESCRIPTION - PROGRESSION: CLINICAL_PROGRESSION: NOT CHANGED

## 2019-07-30 NOTE — PROGRESS NOTES
Paged the dietician to see if there are clears that are dietary supplements that pt can take now that diet is clears. Awaiting response.

## 2019-07-30 NOTE — PLAN OF CARE
Patient remains free from falls; bed in lowest position. Patient able to use call light appropriately. Pain is controlled with current regime. RN will continue to monitor.

## 2019-07-30 NOTE — PROGRESS NOTES
Vascular Surgery Progress Note         PATIENT NAME: Moses Hamilton     TODAY'S DATE: 7/30/2019, 5:47 AM      SUBJECTIVE:    Pt seen and examined. No acute overnight events. Afebrile. Patient complains of discomfort with NGT in place. NGT clamped overnight by nurse, put back on LIWS per patient preference at [facility]. NGT 370cc bilious in 24 hrs. Passed 3-4 soft bowel movements, passing flatus. Patient notes abdomen is less distended. Denies fevers, chills, nausea, vomiting, chest pain, SOB. Patient continues to ambulate independently in hallways with no difficulties. OBJECTIVE:   Vitals:  BP (!) 140/73   Pulse 75   Temp 98.8 °F (37.1 °C) (Oral)   Resp 16   Ht 5' 9\" (1.753 m)   Wt 179 lb 14.3 oz (81.6 kg)   SpO2 96%   BMI 26.57 kg/m²      INTAKE/OUTPUT:      Intake/Output Summary (Last 24 hours) at 7/30/2019 0547  Last data filed at 7/29/2019 2000  Gross per 24 hour   Intake 1062 ml   Output 179 ml   Net 883 ml     NGT: 370 cc bilious               GENERAL: AOx3, NAD  HEENT: EOMI bilaterally  CARDIAC: Regular rate and rhythm. ABDOMEN: Soft, NT, moderately distended, Active Bowel Sounds. Reducible Right inguinal hernia - no erythema or ecchymosis. EXTREMITY: Moves all extremities, no edema. 2+ Palpable pulses bilateral DP, PT.  NEURO: CN II-XII intact. Gross motor intact. INCISION: Midline abdominal incision with staples, dry, intact. No erythema, edema, purulent drainage, fluctuance, or crepitus. Bilateral inguinal incisions clean, dry, and intact.      Data:  CBC with Differential:    Lab Results   Component Value Date    WBC 17.7 07/29/2019    RBC 3.41 07/29/2019    HGB 10.3 07/29/2019    HCT 32.4 07/29/2019     07/29/2019    MCV 95.0 07/29/2019    MCH 30.2 07/29/2019    MCHC 31.8 07/29/2019    RDW 12.7 07/29/2019    LYMPHOPCT 10 07/29/2019    MONOPCT 15 07/29/2019    BASOPCT 0 07/29/2019    MONOSABS 2.66 07/29/2019    LYMPHSABS 1.77 07/29/2019    EOSABS 0.35 07/29/2019    BASOSABS 0.00 07/29/2019

## 2019-07-30 NOTE — PLAN OF CARE
Pain is well controlled; Patient is able to use call light appropriately; bed wheels remain locked and bed in lowest position. Will continue to monitor.

## 2019-07-30 NOTE — PLAN OF CARE
Pt free from falls and injury. Call light and personal belongings within reach. Bed locked in lowest position with 2/4 side rails up.

## 2019-07-31 VITALS
RESPIRATION RATE: 20 BRPM | OXYGEN SATURATION: 99 % | SYSTOLIC BLOOD PRESSURE: 140 MMHG | TEMPERATURE: 98.2 F | DIASTOLIC BLOOD PRESSURE: 77 MMHG | HEART RATE: 87 BPM | WEIGHT: 179.9 LBS | BODY MASS INDEX: 26.64 KG/M2 | HEIGHT: 69 IN

## 2019-07-31 LAB
ABSOLUTE EOS #: 0.69 K/UL (ref 0–0.4)
ABSOLUTE IMMATURE GRANULOCYTE: 0.46 K/UL (ref 0–0.3)
ABSOLUTE LYMPH #: 2.77 K/UL (ref 1–4.8)
ABSOLUTE MONO #: 2.77 K/UL (ref 0.1–0.8)
ANION GAP SERPL CALCULATED.3IONS-SCNC: 11 MMOL/L (ref 9–17)
BASOPHILS # BLD: 0 % (ref 0–2)
BASOPHILS ABSOLUTE: 0 K/UL (ref 0–0.2)
BUN BLDV-MCNC: 6 MG/DL (ref 8–23)
BUN/CREAT BLD: ABNORMAL (ref 9–20)
CALCIUM SERPL-MCNC: 8.2 MG/DL (ref 8.6–10.4)
CHLORIDE BLD-SCNC: 99 MMOL/L (ref 98–107)
CO2: 24 MMOL/L (ref 20–31)
CREAT SERPL-MCNC: 0.71 MG/DL (ref 0.7–1.2)
DIFFERENTIAL TYPE: ABNORMAL
EOSINOPHILS RELATIVE PERCENT: 3 % (ref 1–4)
GFR AFRICAN AMERICAN: >60 ML/MIN
GFR NON-AFRICAN AMERICAN: >60 ML/MIN
GFR SERPL CREATININE-BSD FRML MDRD: ABNORMAL ML/MIN/{1.73_M2}
GFR SERPL CREATININE-BSD FRML MDRD: ABNORMAL ML/MIN/{1.73_M2}
GLUCOSE BLD-MCNC: 87 MG/DL (ref 70–99)
HCT VFR BLD CALC: 34.7 % (ref 40.7–50.3)
HEMOGLOBIN: 10.9 G/DL (ref 13–17)
IMMATURE GRANULOCYTES: 2 %
LYMPHOCYTES # BLD: 12 % (ref 24–44)
MCH RBC QN AUTO: 30.1 PG (ref 25.2–33.5)
MCHC RBC AUTO-ENTMCNC: 31.4 G/DL (ref 28.4–34.8)
MCV RBC AUTO: 95.9 FL (ref 82.6–102.9)
MONOCYTES # BLD: 12 % (ref 1–7)
MORPHOLOGY: NORMAL
NRBC AUTOMATED: 0 PER 100 WBC
PDW BLD-RTO: 12.7 % (ref 11.8–14.4)
PLATELET # BLD: 592 K/UL (ref 138–453)
PLATELET ESTIMATE: ABNORMAL
PMV BLD AUTO: 9.3 FL (ref 8.1–13.5)
POTASSIUM SERPL-SCNC: 4.1 MMOL/L (ref 3.7–5.3)
RBC # BLD: 3.62 M/UL (ref 4.21–5.77)
RBC # BLD: ABNORMAL 10*6/UL
SEG NEUTROPHILS: 71 % (ref 36–66)
SEGMENTED NEUTROPHILS ABSOLUTE COUNT: 16.41 K/UL (ref 1.8–7.7)
SODIUM BLD-SCNC: 134 MMOL/L (ref 135–144)
WBC # BLD: 23.1 K/UL (ref 3.5–11.3)
WBC # BLD: ABNORMAL 10*3/UL

## 2019-07-31 PROCEDURE — 6360000002 HC RX W HCPCS: Performed by: STUDENT IN AN ORGANIZED HEALTH CARE EDUCATION/TRAINING PROGRAM

## 2019-07-31 PROCEDURE — 36415 COLL VENOUS BLD VENIPUNCTURE: CPT

## 2019-07-31 PROCEDURE — 6370000000 HC RX 637 (ALT 250 FOR IP): Performed by: STUDENT IN AN ORGANIZED HEALTH CARE EDUCATION/TRAINING PROGRAM

## 2019-07-31 PROCEDURE — 85025 COMPLETE CBC W/AUTO DIFF WBC: CPT

## 2019-07-31 PROCEDURE — 2580000003 HC RX 258: Performed by: STUDENT IN AN ORGANIZED HEALTH CARE EDUCATION/TRAINING PROGRAM

## 2019-07-31 PROCEDURE — 80048 BASIC METABOLIC PNL TOTAL CA: CPT

## 2019-07-31 PROCEDURE — 2500000003 HC RX 250 WO HCPCS: Performed by: STUDENT IN AN ORGANIZED HEALTH CARE EDUCATION/TRAINING PROGRAM

## 2019-07-31 RX ORDER — PSEUDOEPHEDRINE HCL 30 MG
100 TABLET ORAL 2 TIMES DAILY
Qty: 30 CAPSULE | Refills: 1 | Status: SHIPPED | OUTPATIENT
Start: 2019-07-31 | End: 2020-08-12

## 2019-07-31 RX ORDER — DOCUSATE SODIUM 100 MG/1
100 CAPSULE, LIQUID FILLED ORAL 2 TIMES DAILY
Status: DISCONTINUED | OUTPATIENT
Start: 2019-07-31 | End: 2019-07-31 | Stop reason: HOSPADM

## 2019-07-31 RX ORDER — OXYCODONE HYDROCHLORIDE 5 MG/1
5 TABLET ORAL EVERY 6 HOURS PRN
Qty: 20 TABLET | Refills: 0 | Status: SHIPPED | OUTPATIENT
Start: 2019-07-31 | End: 2019-08-05

## 2019-07-31 RX ORDER — FAMOTIDINE 20 MG/1
20 TABLET, FILM COATED ORAL 2 TIMES DAILY
Status: DISCONTINUED | OUTPATIENT
Start: 2019-07-31 | End: 2019-07-31 | Stop reason: HOSPADM

## 2019-07-31 RX ADMIN — OXYCODONE HYDROCHLORIDE 5 MG: 5 TABLET ORAL at 11:02

## 2019-07-31 RX ADMIN — FAMOTIDINE 20 MG: 10 INJECTION, SOLUTION INTRAVENOUS at 08:14

## 2019-07-31 RX ADMIN — SODIUM CHLORIDE, PRESERVATIVE FREE 10 ML: 5 INJECTION INTRAVENOUS at 08:12

## 2019-07-31 RX ADMIN — ACETAMINOPHEN 1000 MG: 500 TABLET ORAL at 08:13

## 2019-07-31 RX ADMIN — BISACODYL 10 MG: 10 SUPPOSITORY RECTAL at 08:26

## 2019-07-31 RX ADMIN — Medication 100 MG: at 08:26

## 2019-07-31 RX ADMIN — OXYCODONE HYDROCHLORIDE 5 MG: 5 TABLET ORAL at 04:42

## 2019-07-31 RX ADMIN — ENOXAPARIN SODIUM 40 MG: 40 INJECTION SUBCUTANEOUS at 08:25

## 2019-07-31 RX ADMIN — ASPIRIN 81 MG: 81 TABLET, CHEWABLE ORAL at 08:25

## 2019-07-31 ASSESSMENT — PAIN DESCRIPTION - DESCRIPTORS
DESCRIPTORS: ACHING;DISCOMFORT
DESCRIPTORS: DULL
DESCRIPTORS: DISCOMFORT;SORE;TENDER
DESCRIPTORS: ACHING;DISCOMFORT

## 2019-07-31 ASSESSMENT — PAIN SCALES - GENERAL
PAINLEVEL_OUTOF10: 0
PAINLEVEL_OUTOF10: 7
PAINLEVEL_OUTOF10: 7
PAINLEVEL_OUTOF10: 3
PAINLEVEL_OUTOF10: 0
PAINLEVEL_OUTOF10: 2
PAINLEVEL_OUTOF10: 0
PAINLEVEL_OUTOF10: 5

## 2019-07-31 ASSESSMENT — PAIN DESCRIPTION - ONSET
ONSET: GRADUAL
ONSET: ON-GOING
ONSET: ON-GOING

## 2019-07-31 ASSESSMENT — PAIN DESCRIPTION - ORIENTATION
ORIENTATION: MID;LOWER
ORIENTATION: MID;LOWER
ORIENTATION: RIGHT;LEFT

## 2019-07-31 ASSESSMENT — PAIN DESCRIPTION - FREQUENCY
FREQUENCY: CONTINUOUS
FREQUENCY: INTERMITTENT
FREQUENCY: CONTINUOUS

## 2019-07-31 ASSESSMENT — PAIN DESCRIPTION - PROGRESSION
CLINICAL_PROGRESSION: NOT CHANGED

## 2019-07-31 ASSESSMENT — PAIN DESCRIPTION - PAIN TYPE
TYPE: SURGICAL PAIN
TYPE: ACUTE PAIN
TYPE: ACUTE PAIN
TYPE: SURGICAL PAIN

## 2019-07-31 ASSESSMENT — PAIN DESCRIPTION - LOCATION
LOCATION: ABDOMEN
LOCATION: BACK
LOCATION: GROIN
LOCATION: BACK

## 2019-07-31 NOTE — PROGRESS NOTES
Vascular Surgery Progress Note         PATIENT NAME: Julián Justin     TODAY'S DATE: 7/31/2019, 5:47 AM    SUBJECTIVE:    Pt seen and examined. No acute events overnight. NGT removed yesterday, started on PO general diet. Patient endorses having no appetite and has mild nausea with PO food, but no vomiting. Has mild pain at groin incisions and also complains of mild back pain from sitting in bed. Continues to ambulate independently in hallways with no distress. BM+, Flatus +. Patient denies fevers, chills, vomiting, abdominal pain, lower extremity pain, chest pain, SOB. OBJECTIVE:   Vitals:  BP (!) 141/69   Pulse 76   Temp 98.6 °F (37 °C) (Oral)   Resp 16   Ht 5' 9\" (1.753 m)   Wt 179 lb 14.3 oz (81.6 kg)   SpO2 97%   BMI 26.57 kg/m²      INTAKE/OUTPUT:      Intake/Output Summary (Last 24 hours) at 7/31/2019 0547  Last data filed at 7/30/2019 0610  Gross per 24 hour   Intake 1284.54 ml   Output --   Net 1284.54 ml                 GENERAL: AOx3, NAD  HEENT: EOMI bilaterally  CARDIAC: Regular rate and rhythm. ABDOMEN: Soft, NT, mild distension, Active Bowel Sounds. Reducible Right inguinal hernia - no erythema or ecchymosis.   EXTREMITY: moves all extremities, no edema. Pulses palpable 2+ bilateral DP/PT. NEURO: CN II-XII intact. Gross motor intact. INCISION: Midline abdominal incision with staples, dry, intact. No erythema, edema, purulent drainage, fluctuance, or crepitus.  Bilateral inguinal incisions clean, dry, and intact.     Data:  CBC with Differential:    Lab Results   Component Value Date    WBC 17.7 07/29/2019    RBC 3.41 07/29/2019    HGB 10.3 07/29/2019    HCT 32.4 07/29/2019     07/29/2019    MCV 95.0 07/29/2019    MCH 30.2 07/29/2019    MCHC 31.8 07/29/2019    RDW 12.7 07/29/2019    LYMPHOPCT 10 07/29/2019    MONOPCT 15 07/29/2019    BASOPCT 0 07/29/2019    MONOSABS 2.66 07/29/2019    LYMPHSABS 1.77 07/29/2019    EOSABS 0.35 07/29/2019    BASOSABS 0.00 07/29/2019    DIFFTYPE

## 2019-07-31 NOTE — PLAN OF CARE
Problem: Musculor/Skeletal Functional Status  Goal: Highest potential functional level  7/31/2019 1629 by Carly Puri RN  Outcome: Completed  7/31/2019 0251 by Alberta Paredes RN  Outcome: Ongoing     Problem: Pain:  Goal: Pain level will decrease  Description  Pain level will decrease  7/31/2019 1629 by Carly Puri RN  Outcome: Completed  7/31/2019 0251 by Alberta Paredes RN  Outcome: Ongoing  Goal: Control of acute pain  Description  Control of acute pain  7/31/2019 1629 by Carly Puri RN  Outcome: Completed  7/31/2019 0251 by Alberta Paredes RN  Outcome: Ongoing  Goal: Control of chronic pain  Description  Control of chronic pain  7/31/2019 1629 by Carly Puri RN  Outcome: Completed  7/31/2019 0251 by Alberta Paredes RN  Outcome: Ongoing     Problem: Falls - Risk of:  Goal: Will remain free from falls  Description  Will remain free from falls  7/31/2019 1629 by Carly Puri RN  Outcome: Completed  7/31/2019 0251 by Alberta Paredes RN  Outcome: Ongoing  Goal: Absence of physical injury  Description  Absence of physical injury  7/31/2019 1629 by Carly Puri RN  Outcome: Completed  7/31/2019 0251 by Alberta Paredes RN  Outcome: Ongoing     Problem: Nutrition  Goal: Optimal nutrition therapy  7/31/2019 1629 by Carly Puri RN  Outcome: Completed  7/31/2019 0251 by Alberta Paredes RN  Outcome: Ongoing     Problem: Skin Integrity:  Goal: Will show no infection signs and symptoms  Description  Will show no infection signs and symptoms  7/31/2019 1629 by Carly Puri RN  Outcome: Completed  7/31/2019 0251 by Alberta Paredes RN  Outcome: Ongoing  Goal: Absence of new skin breakdown  Description  Absence of new skin breakdown  7/31/2019 1629 by Carly Puri RN  Outcome: Completed  7/31/2019 0251 by Alberta Paredes RN  Outcome: Ongoing

## 2020-07-23 ENCOUNTER — HOSPITAL ENCOUNTER (OUTPATIENT)
Dept: VASCULAR LAB | Age: 60
Discharge: HOME | End: 2020-07-25
Payer: COMMERCIAL

## 2020-07-23 PROCEDURE — 93922 UPR/L XTREMITY ART 2 LEVELS: CPT

## 2020-08-12 ENCOUNTER — OFFICE VISIT (OUTPATIENT)
Dept: VASCULAR SURGERY | Age: 60
End: 2020-08-12
Payer: COMMERCIAL

## 2020-08-12 VITALS
DIASTOLIC BLOOD PRESSURE: 85 MMHG | SYSTOLIC BLOOD PRESSURE: 162 MMHG | RESPIRATION RATE: 16 BRPM | HEART RATE: 59 BPM | BODY MASS INDEX: 24.34 KG/M2 | HEIGHT: 70 IN | WEIGHT: 170 LBS | TEMPERATURE: 98.3 F

## 2020-08-12 PROBLEM — I73.9 CLAUDICATION IN PERIPHERAL VASCULAR DISEASE (HCC): Status: ACTIVE | Noted: 2020-08-12

## 2020-08-12 PROCEDURE — 4004F PT TOBACCO SCREEN RCVD TLK: CPT | Performed by: SURGERY

## 2020-08-12 PROCEDURE — G8420 CALC BMI NORM PARAMETERS: HCPCS | Performed by: SURGERY

## 2020-08-12 PROCEDURE — 99213 OFFICE O/P EST LOW 20 MIN: CPT | Performed by: SURGERY

## 2020-08-12 PROCEDURE — G8427 DOCREV CUR MEDS BY ELIG CLIN: HCPCS | Performed by: SURGERY

## 2020-08-12 PROCEDURE — 3017F COLORECTAL CA SCREEN DOC REV: CPT | Performed by: SURGERY

## 2020-08-12 RX ORDER — CLOPIDOGREL BISULFATE 75 MG/1
75 TABLET ORAL DAILY
COMMUNITY

## 2020-08-12 RX ORDER — METOPROLOL SUCCINATE 25 MG/1
25 TABLET, EXTENDED RELEASE ORAL DAILY
COMMUNITY

## 2020-08-12 NOTE — PATIENT INSTRUCTIONS
SURVEY:    You may be receiving a survey from [employer] regarding your visit today. Please complete the survey to enable us to provide the highest quality of care to you and your family. If you cannot score us a very good on any question, please call the office to discuss how we could have made your experience a very good one. Thank you. Please recheck your blood pressure when you go home and make sure you take your prescribed medication if applicable . Please follow up with your PCP or ER if needed. Patient Education        Learning About Coronavirus [phone])  Coronavirus [phone]): Overview  What is coronavirus (NKMIZ-67)? The coronavirus disease (COVID-19) is caused by a virus. It is an illness that was first found in [place], [place], in December 2019. It has since spread worldwide. The virus can cause fever, cough, and trouble breathing. In severe cases, it can cause pneumonia and make it hard to breathe without help. It can cause death. Coronaviruses are a large group of viruses. They cause the common cold. They also cause more serious illnesses like Middle East respiratory syndrome (MERS) and severe acute respiratory syndrome (SARS). COVID-19 is caused by a novel coronavirus. That means it's a new type that has not been seen in people before. This virus spreads person-to-person through droplets from coughing and sneezing. It can also spread when you are close to someone who is infected. And it can spread when you touch something that has the virus on it, such as a doorknob or a tabletop. What can you do to protect yourself from coronavirus (COVID-19)? The best way to protect yourself from getting sick is to:  · Avoid areas where there is an outbreak. · Avoid contact with people who may be infected. · Wash your hands often with soap or alcohol-based hand sanitizers. · Avoid crowds and try to stay at least 6 feet away from other people.   · Wash your hands often, especially after you face cover to prevent exposing other people to the virus. Where can you get the latest information? The following health organizations are tracking and studying this virus. Their websites contain the most up-to-date information. Kaleigh Garcia also learn what to do if you think you may have been exposed to the virus. · U.S. Centers for Disease Control and Prevention (CDC): The CDC provides updated news about the disease and travel advice. The website also tells you how to prevent the spread of infection. www.cdc.gov  · World Health Organization [facility]): WHO offers information about the virus outbreaks. WHO also has travel advice. www.who.int  Current as of: May 8, 2020               Content Version: 12.5  © 2006-2020 Healthwise, Incorporated. Care instructions adapted under license by [facility] ([facility]). If you have questions about a medical condition or this instruction, always ask your healthcare professional. [address] any warranty or liability for your use of this information.

## 2020-08-12 NOTE — PROGRESS NOTES
negative for  fevers, chills, sweats, fatigue, and weight loss  HEENT:  negative for vision or hearing changes,   Respiratory:  negative for shortness of breath, cough, or congestion  Cardiovascular:  negative for  chest pain, palpitations  Gastrointestinal:  negative for nausea, vomiting, diarrhea, constipation, abdominal pain  Genitourinary:  negative for frequency, dysuria  Integument/Breast:  negative for rash, skin lesions  Musculoskeletal:  negative for muscle aches or joint pain  Neurological:  negative for headaches, dizziness, lightheadedness, numbness, pain and tingling extremities  Behavior/Psych:  negative for depression and anxiety    Allergies: Patient has no known allergies.     Current Meds:  Current Outpatient Medications:     clopidogrel (PLAVIX) 75 MG tablet, Take 75 mg by mouth daily, Disp: , Rfl:     metoprolol succinate (TOPROL XL) 25 MG extended release tablet, Take 25 mg by mouth daily, Disp: , Rfl:     Rosuvastatin Calcium (CRESTOR PO), Take by mouth, Disp: , Rfl:     Vital Signs:  Vitals:    08/12/20 1004   BP: (!) 162/85   Pulse: 59   Resp:    Temp:        Physical Exam:  General appearance - alert, well appearing and in no acute distress  Mental status - oriented to person, place and time with normal affect  Head - normocephalic and atraumatic  Eyes - pupils equal and reactive, extraocular eye movements intact, conjunctiva clear  Ears - hearing appears to be intact  Nose - no drainage noted  Mouth - mucous membranes moist  Neck - supple, no carotid bruits, thyroid not palpable, no JVD  Chest - clear to auscultation, normal effort  Heart - normal rate, regular rhythm, no murmurs  Abdomen - soft, non-tender, non-distended, bowel sounds present all four quadrants, no masses, hepatomegaly, splenomegaly or aortic enlargement  Neurological - normal speech, no focal findings or movement disorder noted, cranial nerves II through XII grossly intact  Extremities - peripheral pulses palpable, no pedal edema or calf pain with palpation  Skin - no gross lesions, rashes, or induration noted      R brachial 2+ L brachial 2+   R radial 2+ L radial 2+   R femoral 2+ L femoral 2+   R popliteal 0+ L popliteal 2+   R posterior tibial 0+ L posterior tibial 2+   R dorsalis pedis 0+ L dorsalis pedis 2+     Labs:   Lab Results   Component Value Date    WBC 23.1 07/31/2019    HGB 10.9 07/31/2019    HCT 34.7 07/31/2019    MCV 95.9 07/31/2019     07/31/2019     Lab Results   Component Value Date     07/31/2019    K 4.1 07/31/2019    CL 99 07/31/2019    CO2 24 07/31/2019    BUN 6 07/31/2019    CREATININE 0.71 07/31/2019    GLUCOSE 87 07/31/2019    CALCIUM 8.2 07/31/2019     No results found for: ALKPHOS, ALT, AST, PROT, BILITOT, BILIDIR, LABALBU  No results found for: LACTA  No results found for: AMYLASE  No results found for: LIPASE  No results found for: INR      Assessment:  3 65-year-old male with severe claudication of the right lower extremity, history of aortobifemoral bypass    1. Aortic occlusion (HCC)        Plan:   1. He will be scheduled for an arteriogram of the right lower extremity  With intervention for presumed SFA occlusion. No orders of the defined types were placed in this encounter.           Electronically signed by Vicky Vazquez MD on 8/12/2020 at 10:12 AM     Copy sent to Dr. Marnie Ramos MD

## 2020-09-04 ENCOUNTER — HOSPITAL ENCOUNTER (OUTPATIENT)
Dept: CARDIAC CATH/INVASIVE PROCEDURES | Age: 60
Discharge: HOME | End: 2020-09-04
Attending: SURGERY | Admitting: SURGERY
Payer: COMMERCIAL

## 2020-09-04 VITALS
TEMPERATURE: 97.7 F | SYSTOLIC BLOOD PRESSURE: 160 MMHG | OXYGEN SATURATION: 99 % | RESPIRATION RATE: 16 BRPM | DIASTOLIC BLOOD PRESSURE: 87 MMHG | HEART RATE: 47 BPM

## 2020-09-04 LAB
ANION GAP SERPL CALCULATED.3IONS-SCNC: 13 MMOL/L (ref 9–17)
BUN BLDV-MCNC: 15 MG/DL (ref 8–23)
BUN/CREAT BLD: 12 (ref 9–20)
CALCIUM SERPL-MCNC: 9.1 MG/DL (ref 8.6–10.4)
CHLORIDE BLD-SCNC: 102 MMOL/L (ref 98–107)
CO2: 23 MMOL/L (ref 20–31)
CREAT SERPL-MCNC: 1.27 MG/DL (ref 0.7–1.2)
GFR AFRICAN AMERICAN: >60 ML/MIN
GFR NON-AFRICAN AMERICAN: 58 ML/MIN
GFR SERPL CREATININE-BSD FRML MDRD: ABNORMAL ML/MIN/{1.73_M2}
GFR SERPL CREATININE-BSD FRML MDRD: ABNORMAL ML/MIN/{1.73_M2}
GLUCOSE BLD-MCNC: 96 MG/DL (ref 70–99)
HCT VFR BLD CALC: 50.4 % (ref 40.7–50.3)
HEMOGLOBIN: 16.5 G/DL (ref 13–17)
MCH RBC QN AUTO: 30.9 PG (ref 25.2–33.5)
MCHC RBC AUTO-ENTMCNC: 32.7 G/DL (ref 28.4–34.8)
MCV RBC AUTO: 94.4 FL (ref 82.6–102.9)
NRBC AUTOMATED: 0 PER 100 WBC
PDW BLD-RTO: 13 % (ref 11.8–14.4)
PLATELET # BLD: 304 K/UL (ref 138–453)
PMV BLD AUTO: 9.7 FL (ref 8.1–13.5)
POTASSIUM SERPL-SCNC: 3.8 MMOL/L (ref 3.7–5.3)
RBC # BLD: 5.34 M/UL (ref 4.21–5.77)
SODIUM BLD-SCNC: 138 MMOL/L (ref 135–144)
WBC # BLD: 12.1 K/UL (ref 3.5–11.3)

## 2020-09-04 PROCEDURE — C1769 GUIDE WIRE: HCPCS

## 2020-09-04 PROCEDURE — C1724 CATH, TRANS ATHEREC,ROTATION: HCPCS

## 2020-09-04 PROCEDURE — 2580000003 HC RX 258: Performed by: SURGERY

## 2020-09-04 PROCEDURE — 2500000003 HC RX 250 WO HCPCS

## 2020-09-04 PROCEDURE — 37225 HC ATHERECTOMY FEM/POP: CPT | Performed by: SURGERY

## 2020-09-04 PROCEDURE — 75774 ARTERY X-RAY EACH VESSEL: CPT | Performed by: SURGERY

## 2020-09-04 PROCEDURE — 7100000010 HC PHASE II RECOVERY - FIRST 15 MIN

## 2020-09-04 PROCEDURE — 75710 ARTERY X-RAYS ARM/LEG: CPT | Performed by: SURGERY

## 2020-09-04 PROCEDURE — C1725 CATH, TRANSLUMIN NON-LASER: HCPCS

## 2020-09-04 PROCEDURE — 6370000000 HC RX 637 (ALT 250 FOR IP): Performed by: SURGERY

## 2020-09-04 PROCEDURE — 7100000000 HC PACU RECOVERY - FIRST 15 MIN

## 2020-09-04 PROCEDURE — 99152 MOD SED SAME PHYS/QHP 5/>YRS: CPT

## 2020-09-04 PROCEDURE — 6360000002 HC RX W HCPCS

## 2020-09-04 PROCEDURE — C1894 INTRO/SHEATH, NON-LASER: HCPCS

## 2020-09-04 PROCEDURE — 6360000004 HC RX CONTRAST MEDICATION

## 2020-09-04 PROCEDURE — 7100000011 HC PHASE II RECOVERY - ADDTL 15 MIN

## 2020-09-04 PROCEDURE — 80048 BASIC METABOLIC PNL TOTAL CA: CPT

## 2020-09-04 PROCEDURE — 85027 COMPLETE CBC AUTOMATED: CPT

## 2020-09-04 PROCEDURE — 99153 MOD SED SAME PHYS/QHP EA: CPT

## 2020-09-04 PROCEDURE — 7100000001 HC PACU RECOVERY - ADDTL 15 MIN

## 2020-09-04 PROCEDURE — C2623 CATH, TRANSLUMIN, DRUG-COAT: HCPCS

## 2020-09-04 PROCEDURE — 37228 HC PLASTY UNI TIB/PER INITIAL: CPT | Performed by: SURGERY

## 2020-09-04 PROCEDURE — C1887 CATHETER, GUIDING: HCPCS

## 2020-09-04 PROCEDURE — C1760 CLOSURE DEV, VASC: HCPCS

## 2020-09-04 RX ORDER — SODIUM CHLORIDE 9 MG/ML
INJECTION, SOLUTION INTRAVENOUS CONTINUOUS
Status: DISCONTINUED | OUTPATIENT
Start: 2020-09-04 | End: 2020-09-04 | Stop reason: HOSPADM

## 2020-09-04 RX ORDER — CLOPIDOGREL BISULFATE 75 MG/1
75 TABLET ORAL DAILY
Status: DISCONTINUED | OUTPATIENT
Start: 2020-09-05 | End: 2020-09-04 | Stop reason: HOSPADM

## 2020-09-04 RX ORDER — ASPIRIN 81 MG/1
81 TABLET ORAL DAILY
COMMUNITY

## 2020-09-04 RX ORDER — CLOPIDOGREL BISULFATE 75 MG/1
300 TABLET ORAL ONCE
Status: COMPLETED | OUTPATIENT
Start: 2020-09-04 | End: 2020-09-04

## 2020-09-04 RX ORDER — ACETAMINOPHEN 325 MG/1
650 TABLET ORAL EVERY 4 HOURS PRN
Status: DISCONTINUED | OUTPATIENT
Start: 2020-09-04 | End: 2020-09-04 | Stop reason: HOSPADM

## 2020-09-04 RX ADMIN — SODIUM CHLORIDE: 9 INJECTION, SOLUTION INTRAVENOUS at 08:56

## 2020-09-04 RX ADMIN — CLOPIDOGREL BISULFATE 300 MG: 75 TABLET ORAL at 13:23

## 2020-09-04 ASSESSMENT — PAIN SCALES - GENERAL
PAINLEVEL_OUTOF10: 0

## 2020-09-04 ASSESSMENT — PAIN DESCRIPTION - PROGRESSION
CLINICAL_PROGRESSION: NOT CHANGED
CLINICAL_PROGRESSION: NOT CHANGED

## 2020-09-04 ASSESSMENT — PAIN DESCRIPTION - LOCATION
LOCATION: GROIN
LOCATION: GROIN

## 2020-09-04 ASSESSMENT — PAIN DESCRIPTION - ORIENTATION
ORIENTATION: RIGHT
ORIENTATION: RIGHT

## 2020-09-04 ASSESSMENT — PAIN DESCRIPTION - ONSET
ONSET: GRADUAL
ONSET: GRADUAL

## 2020-09-04 ASSESSMENT — PAIN DESCRIPTION - FREQUENCY
FREQUENCY: INTERMITTENT
FREQUENCY: INTERMITTENT

## 2020-09-04 ASSESSMENT — PAIN DESCRIPTION - DESCRIPTORS
DESCRIPTORS: SORE
DESCRIPTORS: SORE

## 2020-09-04 ASSESSMENT — PAIN DESCRIPTION - PAIN TYPE
TYPE: SURGICAL PAIN
TYPE: SURGICAL PAIN

## 2020-09-04 NOTE — OP NOTE
Operative Note        Patient: Amanda Beckett  YOB: 1960  MRN: [id number]    Date of Procedure: 9/4/2020    Pre-Op Diagnosis: Right leg claudication    Post-Op Diagnosis: Same       1) ultrasound-guided access right common femoral artery antegrade  2) right lower extremity runoff with popliteal atherectomy and angioplasty with paclitaxel coated balloon  3) angioplasty of the anterior tibial origin and tibial peroneal trunk with 3 mm x 60 mm balloon    Surgeon:  Richard Elizabeth MD    Assistant:  * No surgical staff found *    Anesthesia: Fentanyl 100mcg  Versed 2 mg    Estimated Blood Loss (mL): Minimal    Complications: None    Specimens:   * No specimens in log *    Implants:  * No implants in log *      Drains: * No LDAs found *    Findings:   1) occlusive disease in the distal popliteal artery of the right leg which may be consistent with an embolic event. Anterior tibial origin had a short segment occlusion as well as the tibial peroneal trunk  2) inline flow and three-vessel runoff was restored after atherectomy and angioplasty. Indications and description of operative procedure: This is a 25-year-old male with a history of aortobifemoral bypass. He complained of right leg claudication and noninvasive vascular study showed a right JUNIOR of 0.51. After the risks benefits alternatives were discussed informed consent was obtained. The patient was brought to the angios suite and placed supine the groins were cleaned and prepped in the usual fashion sterile drapes were applied. Ultrasound was used to identify the right common femoral artery. An antegrade puncture was performed with a micropuncture needle and catheter. And a right lower extremity runoff was obtained which showed the findings as above. A 5 [place] sheath was initially placed but due to the scar tissue the tract had a serially be dilated with an 8 Western Mandy dilator prior to a 7 Western Mandy sheath being placed.   5000 units of heparin were administered and using a Glidewire and a support catheter access was gained into the posterior tibial artery. The wire was exchanged for an 014 wire and the proximal occlusion in the tibial peroneal trunk and proximal posterior tibial were treated with angioplasty with 3 mm balloon. Jetstream atherectomy was then performed on the below-knee popliteal artery with several passes with the blades closed and also open. Paclitaxel coated balloon was deployed 5 mm x 80mm in the below-knee popliteal artery. The anterior tibial was then cannulated and again treated with angioplasty. Completion arteriogram showed inline flow with three-vessel tibial runoff. A minx closure was used to close the arteriotomy. The procedure was tolerated well without complication.

## 2020-09-04 NOTE — H&P
Past Medical History:  has a past medical history of Hyperlipidemia and Hypertension. Past Surgical History:   Past Surgical History         Past Surgical History:   Procedure Laterality Date    ABDOMINAL AORTIC ANEURYSM REPAIR Bilateral 7/20/2019     AORTIC BILATERAL FEMORAL BYPASS WITH 16X8, WITH BILATERAL ILIAC AND BILATERAL FEMORAL THROMBECTOMIES performed by Robb Lopez MD at [address] [facility] Bilateral      VASCULAR SURGERY   07/20/2019     open aorta bi-femoral bypass           Social History:  reports that he has been smoking. He has been smoking about 1.00 pack per day. He has never used smokeless tobacco. He reports current alcohol use. He reports current drug use. Drug: Marijuana. Family History: family history is not on file. Review of Systems:   Constitutional:  negative for  fevers, chills, sweats, fatigue, and weight loss  HEENT:  negative for vision or hearing changes,   Respiratory:  negative for shortness of breath, cough, or congestion  Cardiovascular:  negative for  chest pain, palpitations  Gastrointestinal:  negative for nausea, vomiting, diarrhea, constipation, abdominal pain  Genitourinary:  negative for frequency, dysuria  Integument/Breast:  negative for rash, skin lesions  Musculoskeletal:  negative for muscle aches or joint pain  Neurological:  negative for headaches, dizziness, lightheadedness, numbness, pain and tingling extremities  Behavior/Psych:  negative for depression and anxiety     Allergies: Patient has no known allergies.      Current Meds:  Current Medication   Current Outpatient Medications:     clopidogrel (PLAVIX) 75 MG tablet, Take 75 mg by mouth daily, Disp: , Rfl:     metoprolol succinate (TOPROL XL) 25 MG extended release tablet, Take 25 mg by mouth daily, Disp: , Rfl:     Rosuvastatin Calcium (CRESTOR PO), Take by mouth, Disp: , Rfl:         Vital Signs:      Vitals:     08/12/20 1004   BP: (!) 162/85   Pulse: 59   Resp:     Temp: Physical Exam:  General appearance - alert, well appearing and in no acute distress  Mental status - oriented to person, place and time with normal affect  Head - normocephalic and atraumatic  Eyes - pupils equal and reactive, extraocular eye movements intact, conjunctiva clear  Ears - hearing appears to be intact  Nose - no drainage noted  Mouth - mucous membranes moist  Neck - supple, no carotid bruits, thyroid not palpable, no JVD  Chest - clear to auscultation, normal effort  Heart - normal rate, regular rhythm, no murmurs  Abdomen - soft, non-tender, non-distended, bowel sounds present all four quadrants, no masses, hepatomegaly, splenomegaly or aortic enlargement  Neurological - normal speech, no focal findings or movement disorder noted, cranial nerves II through XII grossly intact  Extremities - peripheral pulses palpable, no pedal edema or calf pain with palpation  Skin - no gross lesions, rashes, or induration noted        R brachial 2+ L brachial 2+   R radial 2+ L radial 2+   R femoral 2+ L femoral 2+   R popliteal 0+ L popliteal 2+   R posterior tibial 0+ L posterior tibial 2+   R dorsalis pedis 0+ L dorsalis pedis 2+      Labs:         Lab Results   Component Value Date     WBC 23.1 07/31/2019     HGB 10.9 07/31/2019     HCT 34.7 07/31/2019     MCV 95.9 07/31/2019      07/31/2019           Lab Results   Component Value Date      07/31/2019     K 4.1 07/31/2019     CL 99 07/31/2019     CO2 24 07/31/2019     BUN 6 07/31/2019     CREATININE 0.71 07/31/2019     GLUCOSE 87 07/31/2019     CALCIUM 8.2 07/31/2019     No results found for: ALKPHOS, ALT, AST, PROT, BILITOT, BILIDIR, LABALBU  No results found for: LACTA  No results found for: AMYLASE  No results found for: LIPASE  No results found for: INR        Assessment:  3 79-year-old male with severe claudication of the right lower extremity, history of aortobifemoral bypass     1. Aortic occlusion (HCC)         Plan:   1.  He will be scheduled for an arteriogram of the right lower extremity  With intervention for presumed SFA occlusion. No orders of the defined types were placed in this encounter.              Electronically signed by Darian Canales MD on 8/12/2020 at 10:12 AM      Copy sent to Dr. Bob Davis MD

## 2020-09-04 NOTE — PRE SEDATION
Sedation Pre-Procedure Note    Patient Name: Lindy Chaney   YOB: 1960  Room/Bed: [facility] CATH Pool/NONE  Medical Record Number: [id number]  Date: 9/4/2020   Time: 10:53 AM       Indication:  Right leg claudication    Consent: I have discussed with the patient and/or the patient representative the indication, alternatives, and the possible risks and/or complications of the planned procedure and the anesthesia methods. The patient and/or patient representative appear to understand and agree to proceed. Vital Signs:   Vitals:    09/04/20 0825   BP: (!) 160/82   Pulse: 55   Resp: 16   Temp: 97.5 °F (36.4 °C)   SpO2: 100%       Past Medical History:   has a past medical history of Hyperlipidemia and Hypertension. Past Surgical History:   has a past surgical history that includes hernia repair (Bilateral); vascular surgery (07/20/2019); and Abdominal aortic aneurysm repair (Bilateral, 7/20/2019). Medications:   Scheduled Meds:    clopidogrel  300 mg Oral Once    Followed by   Kaylene Ruiz ON 9/5/2020] clopidogrel  75 mg Oral Daily     Continuous Infusions:    sodium chloride 100 mL/hr at 09/04/20 0856     PRN Meds: acetaminophen  Home Meds:   Prior to Admission medications    Medication Sig Start Date End Date Taking?  Authorizing Provider   aspirin 81 MG EC tablet Take 81 mg by mouth daily   Yes Historical Provider, MD   clopidogrel (PLAVIX) 75 MG tablet Take 75 mg by mouth daily   Yes Historical Provider, MD   metoprolol succinate (TOPROL XL) 25 MG extended release tablet Take 25 mg by mouth daily   Yes Historical Provider, MD   Rosuvastatin Calcium (CRESTOR PO) Take by mouth daily    Yes Historical Provider, MD     Coumadin Use Last 7 Days:  no  Antiplatelet drug therapy use last 7 days: no  Other anticoagulant use last 7 days: no  Additional Medication Information:        Pre-Sedation Documentation and Exam:   I have personally completed a history, physical exam & review of systems for this patient

## 2020-09-04 NOTE — BRIEF OP NOTE
Brief Postoperative Note      Patient: Jorgito Sweeney  YOB: 1960  MRN: [id number]    Date of Procedure: 9/4/2020    Pre-Op Diagnosis: Right leg claudication    Post-Op Diagnosis: Same       1) ultrasound-guided access right common femoral artery antegrade  2) right lower extremity runoff with popliteal atherectomy and angioplasty with paclitaxel coated balloon  3) angioplasty of the anterior tibial origin and tibial peroneal trunk with 3 mm x 60 mm balloon    Surgeon:  Paulina Post MD    Assistant:  * No surgical staff found *    Anesthesia: Fentanyl 100mcg  Versed 2 mg    Estimated Blood Loss (mL): Minimal    Complications: None    Specimens:   * No specimens in log *    Implants:  * No implants in log *      Drains: * No LDAs found *    Findings:   1) occlusive disease in the distal popliteal artery of the right leg which may be consistent with an embolic event. Anterior tibial origin had a short segment occlusion as well as the tibial peroneal trunk  2) inline flow and three-vessel runoff was restored after atherectomy and angioplasty.     Electronically signed by Paulina Post MD on 9/4/2020 at 10:57 AM

## 2021-09-17 ENCOUNTER — HOSPITAL ENCOUNTER (OUTPATIENT)
Age: 61
Discharge: HOME | End: 2021-09-19
Payer: COMMERCIAL

## 2021-09-17 ENCOUNTER — HOSPITAL ENCOUNTER (OUTPATIENT)
Dept: GENERAL RADIOLOGY | Age: 61
Discharge: HOME | End: 2021-09-19
Payer: COMMERCIAL

## 2021-09-17 DIAGNOSIS — R52 PAIN: ICD-10-CM

## 2021-09-17 PROCEDURE — 72100 X-RAY EXAM L-S SPINE 2/3 VWS: CPT

## 2021-09-17 PROCEDURE — 72220 X-RAY EXAM SACRUM TAILBONE: CPT

## 2023-01-31 ENCOUNTER — TELEPHONE (OUTPATIENT)
Dept: GASTROENTEROLOGY | Age: 63
End: 2023-01-31

## 2023-01-31 ENCOUNTER — OFFICE VISIT (OUTPATIENT)
Dept: GASTROENTEROLOGY | Age: 63
End: 2023-01-31

## 2023-01-31 VITALS
RESPIRATION RATE: 16 BRPM | HEIGHT: 69 IN | HEART RATE: 63 BPM | TEMPERATURE: 97.6 F | BODY MASS INDEX: 26.69 KG/M2 | DIASTOLIC BLOOD PRESSURE: 93 MMHG | SYSTOLIC BLOOD PRESSURE: 214 MMHG | WEIGHT: 180.2 LBS

## 2023-01-31 DIAGNOSIS — Z01.818 PRE-PROCEDURAL EXAMINATION: Primary | ICD-10-CM

## 2023-01-31 DIAGNOSIS — Z12.11 COLON CANCER SCREENING: Primary | ICD-10-CM

## 2023-01-31 RX ORDER — POLYETHYLENE GLYCOL 3350, SODIUM CHLORIDE, SODIUM BICARBONATE, POTASSIUM CHLORIDE 420; 11.2; 5.72; 1.48 G/4L; G/4L; G/4L; G/4L
4000 POWDER, FOR SOLUTION ORAL ONCE
Qty: 4000 ML | Refills: 0 | Status: SHIPPED | OUTPATIENT
Start: 2023-01-31 | End: 2023-01-31

## 2023-01-31 ASSESSMENT — ENCOUNTER SYMPTOMS: BACK PAIN: 1

## 2023-01-31 NOTE — TELEPHONE ENCOUNTER
Colonoscopy Scheduled for 07/12/2023. Surgery scheduling form faxed to surgery. Prep instructions sent with patient. EKG order given to patient.

## 2023-01-31 NOTE — PATIENT INSTRUCTIONS
SURVEY:    You may be receiving a survey from [employer] regarding your visit today. Please complete the survey to enable us to provide the highest quality of care to you and your family. If you cannot score us a very good on any question, please call the office to discuss how we could have made your experience a very good one. Thank you.

## 2023-02-28 ENCOUNTER — HOSPITAL ENCOUNTER (OUTPATIENT)
Age: 63
Discharge: HOME | End: 2023-02-28
Payer: COMMERCIAL

## 2023-02-28 DIAGNOSIS — Z01.818 PRE-PROCEDURAL EXAMINATION: ICD-10-CM

## 2023-02-28 PROCEDURE — 93005 ELECTROCARDIOGRAM TRACING: CPT

## 2023-03-02 LAB
EKG ATRIAL RATE: 59 BPM
EKG P AXIS: 68 DEGREES
EKG P-R INTERVAL: 164 MS
EKG Q-T INTERVAL: 410 MS
EKG QRS DURATION: 92 MS
EKG QTC CALCULATION (BAZETT): 405 MS
EKG R AXIS: 59 DEGREES
EKG T AXIS: 80 DEGREES
EKG VENTRICULAR RATE: 59 BPM

## 2023-04-27 ENCOUNTER — TELEPHONE (OUTPATIENT)
Dept: GASTROENTEROLOGY | Age: 63
End: 2023-04-27

## 2023-04-27 NOTE — TELEPHONE ENCOUNTER
Call placed to patient to advise procedure date has been changed to 07/26/2023. LVM awaiting for patient to return call. [phone]: Patient returned call to office. Patient ok with new date.

## 2023-07-11 ENCOUNTER — TELEPHONE (OUTPATIENT)
Dept: PREADMISSION TESTING | Age: 63
End: 2023-07-11

## 2023-07-11 NOTE — PROGRESS NOTES
Patient states they received their endoscopy prep instructions from the physician's office. Patient states they understand medications to be taken with sip of water only the a.m. of procedure. Results of bowel prep reviewed with patient, appearance should be clear, yellow, liquid prior to arrival at facility. Patient verbalizes understanding. EKG to be reviewed by anesthesia. Telephone Encounter by Tessy Darby at 02/19/18 01:59 PM     Author:  Tessy Darby Service:  (none) Author Type:  Patient      Filed:  02/19/18 02:01 PM Encounter Date:  2/19/2018 Status:  Signed     :  Tessy Darby (Patient )              COLLEEN WATERS    Patient Age: 23 year old    ACCT STATUS:   MESSAGE:[KR1.1T]   Patient would like a call regarding the MRI results form last year, she feels like she is having more muscular issues, how soon can MS be detected? Please advise .[KR1.1M]Message confirmed with caller.     Next and Last Visit with Provider and Department  Next visit with [facility] is on No match found  Next visit with NEUROLOGY is on No match found  Last visit with [facility] was on No match found  Last visit with NEUROLOGY was on 03/15/2017 at 11:30 AM in [facility]     WEIGHT AND HEIGHT: As of 01/09/2018 weight is 183 lbs.(83.008 kg).   BMI is 28.79 kg/(m^2) calculated from:     Height 5' 5\" (1.651 m) as of 2/27/17     Weight 173 lb (78.472 kg) as of 2/27/17      No Known Allergies  Current outpatient prescriptions       Medication  Sig Dispense Refill   • buPROPion (WELLBUTRIN XL) 300 MG 24 hr tablet Take 1 Tab by mouth every morning. 90 Tab 0   • paroxetine (PAXIL) 20 MG tablet Take 0.5 Tabs by mouth every morning. 45 Tab 0   • Cholecalciferol (VITAMIN D3) 2000 UNITS CHEW Take 2,000 Units by mouth daily.        PHARMACY to use:           Pharmacy preference(s) on file: [facility] [address]    CALL BACK INFO:[KR1.1T] Ok to leave response (including medical information) with family member or on answering machine[KR1.1M]  ROUTING:[KR1.1T] Patient's physician/staff[KR1.1M]        PCP: Harleen Prince MD         INS: Payor: [employer] PPO / Plan: *No Plan* / Product Type: *No Product type* / Note: This is the primary coverage, but no account was found for this location or the patient's primary location.    ADDRESS:  [address] [place] [address]  [place] [id number][KR1.1T]       Revision History        User Key Date/Time User Provider Type Action    > KR1.1 02/19/18 02:01 PM Tessy Darby Patient  Sign    M - Manual, T - Template

## 2023-07-24 ENCOUNTER — TELEPHONE (OUTPATIENT)
Dept: GASTROENTEROLOGY | Age: 63
End: 2023-07-24

## 2023-07-24 NOTE — TELEPHONE ENCOUNTER
Reviewed the prep with the patient, reviewed medications to d/c. Reviewed \"light meals\". He has no other questions.
Affect and characteristics of appearance, verbalizations, behaviors are appropriate

## 2023-07-25 ENCOUNTER — ANESTHESIA EVENT (OUTPATIENT)
Dept: OPERATING ROOM | Age: 63
End: 2023-07-25
Payer: COMMERCIAL

## 2023-07-26 ENCOUNTER — HOSPITAL ENCOUNTER (OUTPATIENT)
Age: 63
Setting detail: OUTPATIENT SURGERY
Discharge: HOME | End: 2023-07-26
Attending: INTERNAL MEDICINE | Admitting: INTERNAL MEDICINE
Payer: COMMERCIAL

## 2023-07-26 ENCOUNTER — ANESTHESIA (OUTPATIENT)
Dept: OPERATING ROOM | Age: 63
End: 2023-07-26
Payer: COMMERCIAL

## 2023-07-26 VITALS
TEMPERATURE: 96.9 F | HEIGHT: 69 IN | DIASTOLIC BLOOD PRESSURE: 73 MMHG | OXYGEN SATURATION: 92 % | HEART RATE: 62 BPM | WEIGHT: 175 LBS | SYSTOLIC BLOOD PRESSURE: 133 MMHG | BODY MASS INDEX: 25.92 KG/M2 | RESPIRATION RATE: 18 BRPM

## 2023-07-26 DIAGNOSIS — Z12.11 SCREENING FOR COLON CANCER: ICD-10-CM

## 2023-07-26 PROCEDURE — 2500000003 HC RX 250 WO HCPCS: Performed by: NURSE ANESTHETIST, CERTIFIED REGISTERED

## 2023-07-26 PROCEDURE — 7100000010 HC PHASE II RECOVERY - FIRST 15 MIN: Performed by: INTERNAL MEDICINE

## 2023-07-26 PROCEDURE — 2580000003 HC RX 258: Performed by: NURSE ANESTHETIST, CERTIFIED REGISTERED

## 2023-07-26 PROCEDURE — 3700000000 HC ANESTHESIA ATTENDED CARE: Performed by: INTERNAL MEDICINE

## 2023-07-26 PROCEDURE — 2709999900 HC NON-CHARGEABLE SUPPLY: Performed by: INTERNAL MEDICINE

## 2023-07-26 PROCEDURE — 3609010600 HC COLONOSCOPY POLYPECTOMY SNARE/COLD BIOPSY: Performed by: INTERNAL MEDICINE

## 2023-07-26 PROCEDURE — 6360000002 HC RX W HCPCS: Performed by: NURSE ANESTHETIST, CERTIFIED REGISTERED

## 2023-07-26 PROCEDURE — 88305 TISSUE EXAM BY PATHOLOGIST: CPT

## 2023-07-26 PROCEDURE — 7100000011 HC PHASE II RECOVERY - ADDTL 15 MIN: Performed by: INTERNAL MEDICINE

## 2023-07-26 PROCEDURE — 3700000001 HC ADD 15 MINUTES (ANESTHESIA): Performed by: INTERNAL MEDICINE

## 2023-07-26 RX ORDER — SODIUM CHLORIDE, SODIUM LACTATE, POTASSIUM CHLORIDE, CALCIUM CHLORIDE 600; 310; 30; 20 MG/100ML; MG/100ML; MG/100ML; MG/100ML
INJECTION, SOLUTION INTRAVENOUS CONTINUOUS
Status: DISCONTINUED | OUTPATIENT
Start: 2023-07-26 | End: 2023-07-26 | Stop reason: HOSPADM

## 2023-07-26 RX ORDER — LIDOCAINE HYDROCHLORIDE 20 MG/ML
INJECTION, SOLUTION EPIDURAL; INFILTRATION; INTRACAUDAL; PERINEURAL PRN
Status: DISCONTINUED | OUTPATIENT
Start: 2023-07-26 | End: 2023-07-26 | Stop reason: SDUPTHER

## 2023-07-26 RX ORDER — PROPOFOL 10 MG/ML
INJECTION, EMULSION INTRAVENOUS PRN
Status: DISCONTINUED | OUTPATIENT
Start: 2023-07-26 | End: 2023-07-26 | Stop reason: SDUPTHER

## 2023-07-26 RX ORDER — PROPOFOL 10 MG/ML
INJECTION, EMULSION INTRAVENOUS CONTINUOUS PRN
Status: DISCONTINUED | OUTPATIENT
Start: 2023-07-26 | End: 2023-07-26 | Stop reason: SDUPTHER

## 2023-07-26 RX ORDER — SODIUM CHLORIDE 9 MG/ML
INJECTION, SOLUTION INTRAVENOUS PRN
Status: CANCELLED | OUTPATIENT
Start: 2023-07-26

## 2023-07-26 RX ORDER — SODIUM CHLORIDE 0.9 % (FLUSH) 0.9 %
5-40 SYRINGE (ML) INJECTION PRN
Status: CANCELLED | OUTPATIENT
Start: 2023-07-26

## 2023-07-26 RX ORDER — SODIUM CHLORIDE 0.9 % (FLUSH) 0.9 %
5-40 SYRINGE (ML) INJECTION EVERY 12 HOURS SCHEDULED
Status: CANCELLED | OUTPATIENT
Start: 2023-07-26

## 2023-07-26 RX ADMIN — PHENYLEPHRINE HYDROCHLORIDE 200 MCG: 10 INJECTION INTRAVENOUS at 10:37

## 2023-07-26 RX ADMIN — LIDOCAINE HYDROCHLORIDE 100 MG: 20 INJECTION, SOLUTION EPIDURAL; INFILTRATION; INTRACAUDAL; PERINEURAL at 10:13

## 2023-07-26 RX ADMIN — PHENYLEPHRINE HYDROCHLORIDE 200 MCG: 10 INJECTION INTRAVENOUS at 10:30

## 2023-07-26 RX ADMIN — PHENYLEPHRINE HYDROCHLORIDE 150 MCG: 10 INJECTION INTRAVENOUS at 10:21

## 2023-07-26 RX ADMIN — PHENYLEPHRINE HYDROCHLORIDE 200 MCG: 10 INJECTION INTRAVENOUS at 10:42

## 2023-07-26 RX ADMIN — PHENYLEPHRINE HYDROCHLORIDE 100 MCG: 10 INJECTION INTRAVENOUS at 10:39

## 2023-07-26 RX ADMIN — PHENYLEPHRINE HYDROCHLORIDE 200 MCG: 10 INJECTION INTRAVENOUS at 10:25

## 2023-07-26 RX ADMIN — PROPOFOL 180 MCG/KG/MIN: 10 INJECTION, EMULSION INTRAVENOUS at 10:13

## 2023-07-26 RX ADMIN — PHENYLEPHRINE HYDROCHLORIDE 100 MCG: 10 INJECTION INTRAVENOUS at 10:16

## 2023-07-26 RX ADMIN — PROPOFOL 80 MG: 10 INJECTION, EMULSION INTRAVENOUS at 10:13

## 2023-07-26 RX ADMIN — SODIUM CHLORIDE, POTASSIUM CHLORIDE, SODIUM LACTATE AND CALCIUM CHLORIDE: 600; 310; 30; 20 INJECTION, SOLUTION INTRAVENOUS at 10:00

## 2023-07-26 ASSESSMENT — LIFESTYLE VARIABLES: SMOKING_STATUS: 1

## 2023-07-26 ASSESSMENT — PAIN SCALES - GENERAL: PAINLEVEL_OUTOF10: 0

## 2023-07-26 ASSESSMENT — PAIN - FUNCTIONAL ASSESSMENT: PAIN_FUNCTIONAL_ASSESSMENT: NONE - DENIES PAIN

## 2023-07-26 NOTE — PROGRESS NOTES
Dr. Holger Pena into review procedure with patient and spouse. Patient states ready to be discharged at this time, instructions given. Both verbalize understanding,deny any questions and/or concerns. Pt denies need for wheelchair and transfers self off unit w/ spouse and all belongings, steady gait noted. Discharge Criteria    Inpatients must meet Criteria 1 through 7. All other patients are either YES or N/A. If a NO is chosen then Anesthesia or Surgeon must be notified. 1.  Minimum 30 minutes after last dose of sedative medication. Yes      2. Systolic BP between 90 - 094. Diastolic BP between 60 - 90. Yes      3. Pulse between 60 - 120    Yes      4. Respirations between 8 - 25. Yes      5. SpO2 92% - 100%. Yes      6. Able to cough and swallow or return to baseline function. Yes      7. Alert and oriented or return to baseline mental status. Yes      8. Demonstrates controlled, coordinated movements, ambulates with steady gait, or return to baseline activity function. Yes      9. Minimal or no pain or nausea, or at a level tolerable and acceptable to patient. Yes      10. Takes and retains oral fluids as allowed. Yes      11. Procedural / perioperative site stable. Minimal or no bleeding. Yes          12. If GI endoscopy procedure, minimal or no abdominal distention or passing flatus. Yes      13. Written discharge instructions and emergency telephone number provided. Yes      14. Accompanied by a responsible adult.     Yes

## 2023-07-26 NOTE — DISCHARGE INSTRUCTIONS
IMPRESSION:   Colon polyps   Internal hemorrhoids      RECOMMENDATIONS:   1) Follow up with referring provider, as previously scheduled. 2) Repeat Colonoscopy in 5 years        SAME DAY SURGERY DISCHARGE INSTRUCTIONS    1. Do not drive or operate hazardous machinery for 24 hours. 2.  Do not make important personal or business decisions for 24 hours. 3.  Do not drink alcoholic beverages for 24 hours. 4.  Do not smoke tobacco products for 24 hours. 5.  Eat light foods (Jell-O, soups, etc....) and drink plenty of fluids (water, Sprite, etc...) up to 8 glasses per day, as you can tolerate. 6.  Limit your activities for 24 hours. Do not engage in heavy work until your surgeon gives you permission. 7.  Call your surgeon for any questions regarding your surgery. 8.  Patient should not be left alone for 12-24 hours following surgical procedure. COLONOSCOPY DISCHARGE INSTRUCTIONS:    It's normal to have a feeling of fullness or mild cramping in your abdomen afterwards due to air that is put into your bowel during the procedure. Mild activities such as walking will help you pass the air. You may resume your regular diet. You will receive a letter or phone call with your test results in 2 weeks. If you have not received a letter or a phone call in 2 weeks please call the office for your results. CALL THE DOCTOR IF YOU HAVE:     Chest pain or trouble breathing. Bleeding from your rectum, vomiting or spitting up of blood that is more than a few streaks or red or black stools    A fever above 101F or if you have chills    Pain that is worse or different than any pain you had before the procedure    Nausea or vomiting that lasts for more than 2 hours. If symptoms are to severe call 911 or go to the nearest Emergency Room.

## 2023-07-26 NOTE — ANESTHESIA POSTPROCEDURE EVALUATION
Department of Anesthesiology  Postprocedure Note    Patient: Bro Ricks  MRN: [id number]  YOB: 1960  Date of evaluation: 7/26/2023      Procedure Summary     Date: 07/26/23 Room / Location: [address] [place] [address]    Anesthesia Start: 1010 Anesthesia Stop: 2811    Procedure: COLONOSCOPY POLYPECTOMY COLD BIOPSY Diagnosis:       Screening for colon cancer      (SCREENING)    Surgeons: Cedrick Rangel MD Responsible Provider: JAGRUTI Regan CRNA    Anesthesia Type: general ASA Status: 3          Anesthesia Type: No value filed.     Prieto Phase I: Prieto Score: 10    Prieto Phase II: Prieto Score: 6      Anesthesia Post Evaluation    Patient location during evaluation: PACU  Patient participation: complete - patient participated  Level of consciousness: awake and alert  Pain score: 0  Airway patency: patent  Nausea & Vomiting: no nausea and no vomiting  Complications: no  Cardiovascular status: blood pressure returned to baseline  Respiratory status: acceptable and room air  Hydration status: stable

## 2023-07-26 NOTE — H&P
History and Physical    Patient's Name/Date of Birth: Bro Ricks / 1960 (18 y.o.)    MRN: [id number]     Date: July 26, 2023       CHIEF COMPLAINT:  screening for colon cancer      James L. Pincus Hammans is a 54-year-old man with a past medical history of hypertension hyperlipidemia who presents for colon cancer screening. Family history of colon cancer: No  Blood in stool: No  Unintentional weight loss: No  Abdominal pain: No  Prior colonoscopy: Yes - 12 years ago at age 45. He reports that that there were no concerning findings. Constipation history: Sometimes  Number of bowel movements a day: 1  Change in stool caliber: No    Past Medical History:   Diagnosis Date    Hyperlipidemia     Hypertension      Past Surgical History:   Procedure Laterality Date    ABDOMINAL AORTIC ANEURYSM REPAIR Bilateral 07/20/2019    AORTIC BILATERAL FEMORAL BYPASS WITH 16X8, WITH BILATERAL ILIAC AND BILATERAL FEMORAL THROMBECTOMIES performed by Noel Felder MD at [address] [place] [address] Way Bilateral     mulitple surgeries    OTHER SURGICAL HISTORY  09/04/2020    angiogram    VASCULAR SURGERY  07/20/2019    open aorta bi-femoral bypass     Current Facility-Administered Medications   Medication Dose Route Frequency Provider Last Rate Last Admin    lactated ringers IV soln infusion   IntraVENous Continuous JAGRUTI Francis - CRNA         No Known Allergies  History reviewed. No pertinent family history.   Social History     Socioeconomic History    Marital status:      Spouse name: Not on file    Number of children: Not on file    Years of education: Not on file    Highest education level: Not on file   Occupational History    Not on file   Tobacco Use    Smoking status: Every Day     Packs/day: 0.50     Types: Cigarettes    Smokeless tobacco: Never   Vaping Use    Vaping Use: Never used   Substance and Sexual Activity    Alcohol use: Yes     Comment: minimal - once a week    Drug use: Yes     Types: Marijuana

## 2023-07-26 NOTE — OP NOTE
[facility]    COLONOSCOPY    PROCEDURE DATE: 07/26/23    REFERRING PHYSICIAN: No ref. provider found     PRIMARY CARE PROVIDER: Archana Rebolledo MD    ATTENDING PHYSICIAN: Rios Jorge MD     HISTORY: Mr. Kirsty Irwin is a 58 y.o. male who presents to the  endoscopy unit for colonoscopy. The patient's clinical history is remarkable for hyperlipidemia. He is currently medically stable and appropriate for the planned procedure. PREOPERATIVE DIAGNOSIS: screening for colon cancer. PROCEDURES:   Transanal Colonoscopy --screening. POSTPROCEDURE DIAGNOSIS:  Colon polyps   Internal hemorrhoids    MEDICATIONS: MAC per anesthesia     EBL 2 ml      INSTRUMENT: Olympus CF-H190  AL Pediatric flexible Colonoscope. PREPARATION: The nature and character of the procedure as well as risks, benefits, and alternatives were discussed with the patient and informed consent was obtained. Complications were said to include, but were not limited to: medication allergy, medication reaction, cardiovascular and respiratory problems, bleeding, perforation, infection, and/or missed diagnosis. Following arrival in the endoscopy room, the patient was placed in the left lateral decubitus position and final time-out accomplished in the presence of the nursing staff. Baseline vital signs were obtained and reviewed, and IV sedation was subsequently initiated. FINDINGS: Rectal examination demonstrated no significant visible external abnormality and digital palpation was unremarkable. Following adequate conscious sedation the colonoscope was introduced and advanced under direct visualization to the cecum, which was identified by the ileocecal valve and appendiceal orifice. The bowel preparation was felt to be adequate. Cecal intubation time was 3 minutes. Once maximally inserted, the endoscope was withdrawn and the mucosa was carefully inspected.  The mucosal exam revealed a 6 mm polypoid lesion which was No

## 2023-07-28 LAB — SURGICAL PATHOLOGY REPORT: NORMAL

## 2023-08-03 ENCOUNTER — TELEPHONE (OUTPATIENT)
Dept: GASTROENTEROLOGY | Age: 63
End: 2023-08-03

## 2023-08-03 NOTE — TELEPHONE ENCOUNTER
----- Message from Ole Sharp MD sent at 8/3/2023  6:28 AM EDT -----  Please notify patient: Tubular adenomas are on a pre-cancerous spectrum. Hyperplastic polyps are polyps which are not on a pre-cancerous spectrum. Repeat interval for colonoscopy is 5 years.

## 2024-03-29 NOTE — PROGRESS NOTES
[address]  [address]    Chief Complaint   Patient presents with    New Patient     New pt here for colon screening; pt denies any issues or concerns; last colonoscopy 12 years ago         HPI  Ozzy WILBURN Romina Calvillo is a 77-year-old man with a past medical history of hypertension hyperlipidemia who presents for colon cancer screening. Family history of colon cancer: No  Blood in stool: No  Unintentional weight loss: No  Abdominal pain: No  Prior colonoscopy: Yes - 12 years ago at age 45. He reports that that there were no concerning findings. Constipation history: Sometimes  Number of bowel movements a day: 1  Change in stool caliber: No      Past Medical History:   Diagnosis Date    Hyperlipidemia     Hypertension          Past Surgical History:   Procedure Laterality Date    ABDOMINAL AORTIC ANEURYSM REPAIR Bilateral 07/20/2019    AORTIC BILATERAL FEMORAL BYPASS WITH 16X8, WITH BILATERAL ILIAC AND BILATERAL FEMORAL THROMBECTOMIES performed by Raegan Lomax MD at [place] [address] Bilateral     mulitple surgeries    OTHER SURGICAL HISTORY  09/04/2020    angiogram    VASCULAR SURGERY  07/20/2019    open aorta bi-femoral bypass         Current Outpatient Medications   Medication Sig Dispense Refill    aspirin 81 MG EC tablet Take 81 mg by mouth daily      metoprolol succinate (TOPROL XL) 25 MG extended release tablet Take 25 mg by mouth daily      Rosuvastatin Calcium (CRESTOR PO) Take 5 mg by mouth daily       No current facility-administered medications for this visit. History reviewed. No pertinent family history.        Social Determinants of Health     Tobacco Use: High Risk    Smoking Tobacco Use: Every Day    Smokeless Tobacco Use: Never    Passive Exposure: Not on file   Alcohol Use: Not on file   Financial Resource Strain: Not on file   Food Insecurity: Not on file   Transportation Needs: Not on file   Physical Activity: Not on file   Stress: Not on file Please call [facility] they are calling saying we sent in 2 RX's for Atorvastatin and Simvastatin they do not know which one is correct please call the pharmacy   Social Connections: Not on file   Intimate Partner Violence: Not on file   Depression: Not on file   Housing Stability: Not on file       Review of Systems   Cardiovascular:         History of aortic bypass in 2019   Endocrine:        Hyperlipidemia   Musculoskeletal:  Positive for back pain. All other systems reviewed and are negative. BP (!) 214/93 (Site: Right Upper Arm, Position: Sitting, Cuff Size: Medium Adult)   Pulse 63   Temp 97.6 °F (36.4 °C) (Temporal)   Resp 16   Ht 5' 9\" (1.753 m)   Wt 180 lb 3.2 oz (81.7 kg)   BMI 26.61 kg/m²     Physical Exam  Constitutional:       Appearance: He is normal weight. HENT:      Head: Normocephalic. Right Ear: External ear normal.      Nose: Nose normal.      Mouth/Throat:      Mouth: Mucous membranes are moist.   Eyes:      Pupils: Pupils are equal, round, and reactive to light. Cardiovascular:      Rate and Rhythm: Normal rate and regular rhythm. Pulses: Normal pulses. Pulmonary:      Effort: Pulmonary effort is normal.   Abdominal:      General: Bowel sounds are normal.      Palpations: Abdomen is soft. Comments: Well healed abdominal midline surgical scar   Musculoskeletal:         General: Normal range of motion. Cervical back: Normal range of motion. Skin:     General: Skin is warm. Neurological:      General: No focal deficit present. Mental Status: He is alert. Psychiatric:         Mood and Affect: Mood normal.         Lab Results   Component Value Date    WBC 12.1 (H) 09/04/2020    HGB 16.5 09/04/2020    HCT 50.4 (H) 09/04/2020    MCV 94.4 09/04/2020     09/04/2020        Lab Results   Component Value Date     09/04/2020    K 3.8 09/04/2020     09/04/2020    CO2 23 09/04/2020    BUN 15 09/04/2020    CREATININE 1.27 (H) 09/04/2020    GLUCOSE 96 09/04/2020    CALCIUM 9.1 09/04/2020    LABGLOM 58 (L) 09/04/2020    GFRAA >60 09/04/2020       Assessment    Ozzy Mejias is a 69-year-old man with a past medical history of hypertension hyperlipidemia in need of colon cancer screening. He is average risk for colon cancer . ASA 2, Mallampati score 2. Plan    1. Colon cancer screening  - COLONOSCOPY (Screening); Future  - polyethylene glycol-electrolytes (NULYTELY) 420 g solution; Take 4,000 mLs by mouth once for 1 dose  Dispense: 4000 mL; Refill: 0    2. Additional recommendations based on findings    Informed consent was obtained with a discussion about potential risks and complications of the procedure. Patient verbalized understanding and willingness to continue with the procedure scheduling. Spent 20 minutes with the patient with greater than 50 percent of the time was spent on face-to-face time in discussion with the patient regarding diagnostic options/results, treatment options, counseling, and follow-up plan.       Markell Engel MD

## (undated) DEVICE — EXTRA LARGE VISCERA RETAINER: Brand: VISCERA RETAINER, FISH

## (undated) DEVICE — GOWN,AURORA,NONREINFORCED,LARGE: Brand: MEDLINE

## (undated) DEVICE — SUTURE PROL SZ 4-0 L30IN NONABSORBABLE BLU SH L26MM 1/2 CIR 8831H

## (undated) DEVICE — SKIN AFFIX SURG ADHESIVE 72/CS 0.55ML: Brand: MEDLINE

## (undated) DEVICE — 3M™ STERI-DRAPE™ ISOLATION BAG, 10 PER CARTON / 4 CARTONS PER CASE, 1003: Brand: 3M™ STERI-DRAPE™

## (undated) DEVICE — FOGARTY - HYDRAGRIP SURGICAL - CLAMP INSERTS: Brand: FOGARTY SOFTJAW

## (undated) DEVICE — SUTURE ABSORBABLE BRAIDED 2-0 CT-1 27 IN UD VICRYL J259H

## (undated) DEVICE — TUBING SUCT NON-STRL 9/32X100 W/CNNT

## (undated) DEVICE — GLOVE SURG SZ 6 THK91MIL LTX FREE SYN POLYISOPRENE ANTI

## (undated) DEVICE — SUTURE PROLENE MF BL V-5 DA 4-0 36 8935H

## (undated) DEVICE — 4F 40 CM LEMAITRE EMBOLECTOMY CATHETER: Brand: LEMAITRE EMBOLECTOMY CATHETER

## (undated) DEVICE — SUTURE NONABSORBABLE MONOFILAMENT 6-0 C-1 1X30 IN PROLENE 8706H

## (undated) DEVICE — FOGARTY - HYDRAGRIP SURGICAL - CLAMP INSERTS: Brand: FOGARTY HYDRAJAW

## (undated) DEVICE — TOWEL,OR,DSP,ST,BLUE,DLX,XR,4/PK,20PK/CS: Brand: MEDLINE

## (undated) DEVICE — FORCEPS BX L240CM JAW DIA2.4MM ORNG L CAP W/ NDL DISP RAD

## (undated) DEVICE — DECANTER FLD 9IN ST BG FOR ASEP TRNSF OF FLD

## (undated) DEVICE — DRAPE IRRIG FLD WRM W44XL66IN W/ AORN STD PRTBL INTRATEMP

## (undated) DEVICE — SOLUTION IV IRRIG POUR BRL 0.9% SODIUM CHL 2F7124

## (undated) DEVICE — SUTURE VCRL SZ 3-0 L27IN ABSRB UD L26MM SH 1/2 CIR J416H

## (undated) DEVICE — SUTURE PERMAHAND SZ 3-0 L30IN NONABSORBABLE BLK SILK BRAID A304H

## (undated) DEVICE — CANNULA ORAL NSL AD CO2 N INTUB O2 DEL DISP TRU LNK

## (undated) DEVICE — INTENDED FOR TISSUE SEPARATION, AND OTHER PROCEDURES THAT REQUIRE A SHARP SURGICAL BLADE TO PUNCTURE OR CUT.: Brand: BARD-PARKER ® CARBON RIB-BACK BLADES

## (undated) DEVICE — LOOP VES W25MM THK1MM MAXI RED SIL FLD REPELLENT 100 PER

## (undated) DEVICE — TUBING, SUCTION, 9/32" X 20', STRAIGHT: Brand: MEDLINE INDUSTRIES, INC.

## (undated) DEVICE — SVMMC VASC MAJ PK

## (undated) DEVICE — GOWN,AURORA,NONRNF,XL,30/CS: Brand: MEDLINE

## (undated) DEVICE — SUTURE PDS II SZ 0 L60IN ABSRB VLT L65MM TP-1 1/2 CIR Z991G

## (undated) DEVICE — SUTURE PROL SZ 3-0 L48IN NONABSORBABLE BLU SH L26MM 1/2 CIR 8534H

## (undated) DEVICE — GLOVE ORANGE PI 8   MSG9080

## (undated) DEVICE — COVER LT HNDL BLU PLAS

## (undated) DEVICE — GLOVE SURG SZ 65 THK91MIL LTX FREE SYN POLYISOPRENE

## (undated) DEVICE — CONTAINER SPEC 33OZ URIN COLL BIODEGRADABLE PAPER DISP

## (undated) DEVICE — SUTURE PDS II SZ 3-0 L27IN ABSRB CLR SH L26MM 1/2 CIR TAPR Z416H

## (undated) DEVICE — CHLORAPREP 26ML ORANGE

## (undated) DEVICE — CONTAINER,SPECIMEN,4OZ,OR STRL: Brand: MEDLINE

## (undated) DEVICE — SUTURE PDS II SZ 0 L27IN ABSRB VLT L36MM CT-1 1/2 CIR Z340H

## (undated) DEVICE — 3M™ IOBAN™ 2 ANTIMICROBIAL INCISE DRAPE 6651EZ: Brand: IOBAN™ 2

## (undated) DEVICE — SUTURE MCRYL SZ 4-0 L18IN ABSRB UD L16MM PC-3 3/8 CIR PRIM Y845G

## (undated) DEVICE — SUTURE ETHBND EXCEL SZ 2-0 L30IN NONABSORBABLE GRN L26MM SH X833H

## (undated) DEVICE — SUTURE PROL 4-0 L36IN NONABSORBABLE BLU V-7 L26MM 1/2 CIR 8975H

## (undated) DEVICE — DRAPE,REIN 53X77,STERILE: Brand: MEDLINE

## (undated) DEVICE — GLOVE ORANGE PI 7 1/2   MSG9075

## (undated) DEVICE — SUTURE PROL SZ 5-0 L36IN NONABSORBABLE BLU L13MM C-1 3/8 8720H

## (undated) DEVICE — SUTURE NONABSORBABLE MONOFILAMENT 7-0 BV-1 1X24 IN PROLENE 8702H

## (undated) DEVICE — TOWEL,OR,DSP,ST,NATURAL,DLX,4/PK,20PK/CS: Brand: MEDLINE

## (undated) DEVICE — SUTURE VCRL SZ 0 L27IN ABSRB UD L36MM CT-1 1/2 CIR J260H

## (undated) DEVICE — TOTAL TRAY, 16FR 10ML SIL FOLEY, URN: Brand: MEDLINE

## (undated) DEVICE — 1010 S-DRAPE TOWEL DRAPE 10/BX: Brand: STERI-DRAPE™

## (undated) DEVICE — E-Z CLEAN, NON-STICK, PTFE COATED, ELECTROSURGICAL BLADE ELECTRODE, 6.5 INCH (16.5 CM): Brand: MEGADYNE

## (undated) DEVICE — SUTURE VCRL SZ 4-0 L27IN ABSRB UD L26MM SH 1/2 CIR J415H

## (undated) DEVICE — PAD GEN USE BORDERED ADH 14IN 2IN AND 12IN 4IN GZ UNIV ST

## (undated) DEVICE — Device

## (undated) DEVICE — TRAP SURG QUAD PARABOLA SLOT DSGN SFTY SCRN TRAPEASE

## (undated) DEVICE — TAPE UMB 72X7/32 IN